# Patient Record
Sex: MALE | Race: OTHER | Employment: OTHER | URBAN - METROPOLITAN AREA
[De-identification: names, ages, dates, MRNs, and addresses within clinical notes are randomized per-mention and may not be internally consistent; named-entity substitution may affect disease eponyms.]

---

## 2017-01-01 ENCOUNTER — TELEPHONE (OUTPATIENT)
Dept: FAMILY MEDICINE CLINIC | Facility: CLINIC | Age: 82
End: 2017-01-01

## 2017-01-01 ENCOUNTER — OFFICE VISIT (OUTPATIENT)
Dept: FAMILY MEDICINE CLINIC | Facility: CLINIC | Age: 82
End: 2017-01-01

## 2017-01-01 ENCOUNTER — APPOINTMENT (OUTPATIENT)
Dept: LAB | Age: 82
End: 2017-01-01
Attending: INTERNAL MEDICINE
Payer: MEDICARE

## 2017-01-01 VITALS
SYSTOLIC BLOOD PRESSURE: 118 MMHG | HEART RATE: 80 BPM | DIASTOLIC BLOOD PRESSURE: 68 MMHG | RESPIRATION RATE: 16 BRPM | BODY MASS INDEX: 30.13 KG/M2 | WEIGHT: 192 LBS | HEIGHT: 67 IN

## 2017-01-01 DIAGNOSIS — R35.1 BENIGN PROSTATIC HYPERPLASIA WITH NOCTURIA: ICD-10-CM

## 2017-01-01 DIAGNOSIS — Z91.81 AT RISK FOR FALLS: ICD-10-CM

## 2017-01-01 DIAGNOSIS — E03.9 HYPOTHYROIDISM, UNSPECIFIED TYPE: Chronic | ICD-10-CM

## 2017-01-01 DIAGNOSIS — F03.91 DEMENTIA WITH BEHAVIORAL DISTURBANCE, UNSPECIFIED DEMENTIA TYPE (HCC): ICD-10-CM

## 2017-01-01 DIAGNOSIS — N40.1 BENIGN PROSTATIC HYPERPLASIA WITH NOCTURIA: ICD-10-CM

## 2017-01-01 DIAGNOSIS — Z99.3 WHEELCHAIR BOUND: Primary | ICD-10-CM

## 2017-01-01 DIAGNOSIS — F03.90 DEMENTIA WITHOUT BEHAVIORAL DISTURBANCE, UNSPECIFIED DEMENTIA TYPE (HCC): ICD-10-CM

## 2017-01-01 DIAGNOSIS — M19.90 ARTHRITIS: ICD-10-CM

## 2017-01-01 DIAGNOSIS — E53.8 B12 DEFICIENCY: ICD-10-CM

## 2017-01-01 DIAGNOSIS — F02.81 MAJOR NEUROCOGNITIVE DISORDER DUE TO MULTIPLE ETIOLOGIES WITH BEHAVIORAL DISTURBANCE (HCC): ICD-10-CM

## 2017-01-01 DIAGNOSIS — M17.12 OSTEOARTHRITIS OF LEFT KNEE, UNSPECIFIED OSTEOARTHRITIS TYPE: Primary | ICD-10-CM

## 2017-01-01 PROCEDURE — 84439 ASSAY OF FREE THYROXINE: CPT

## 2017-01-01 PROCEDURE — 82607 VITAMIN B-12: CPT

## 2017-01-01 PROCEDURE — 36415 COLL VENOUS BLD VENIPUNCTURE: CPT

## 2017-01-01 PROCEDURE — 84443 ASSAY THYROID STIM HORMONE: CPT

## 2017-01-01 PROCEDURE — 99214 OFFICE O/P EST MOD 30 MIN: CPT | Performed by: INTERNAL MEDICINE

## 2017-01-01 RX ORDER — MELATONIN
1000 DAILY
COMMUNITY
End: 2018-01-01 | Stop reason: ALTCHOICE

## 2017-01-01 RX ORDER — FINASTERIDE 5 MG/1
5 TABLET, FILM COATED ORAL DAILY
Qty: 90 TABLET | Refills: 1 | Status: SHIPPED | OUTPATIENT
Start: 2017-01-01 | End: 2018-01-01

## 2017-01-01 RX ORDER — TERAZOSIN 10 MG/1
10 CAPSULE ORAL NIGHTLY
Qty: 90 CAPSULE | Refills: 1 | Status: SHIPPED | OUTPATIENT
Start: 2017-01-01 | End: 2018-01-01

## 2017-01-01 RX ORDER — CITALOPRAM 20 MG/1
20 TABLET ORAL DAILY
Qty: 90 TABLET | Refills: 1 | Status: SHIPPED | OUTPATIENT
Start: 2017-01-01 | End: 2018-01-01

## 2017-01-01 RX ORDER — RISPERIDONE 0.5 MG/1
0.5 TABLET, FILM COATED ORAL 2 TIMES DAILY
Qty: 60 TABLET | Refills: 5 | Status: SHIPPED | OUTPATIENT
Start: 2017-01-01 | End: 2018-01-01

## 2017-01-01 RX ORDER — RISPERIDONE 0.5 MG/1
0.5 TABLET, FILM COATED ORAL 2 TIMES DAILY
Qty: 60 TABLET | Refills: 1 | Status: SHIPPED | OUTPATIENT
Start: 2017-01-01 | End: 2017-01-01

## 2017-01-01 RX ORDER — RISPERIDONE 0.5 MG/1
0.5 TABLET, FILM COATED ORAL 2 TIMES DAILY
Qty: 60 TABLET | Refills: 1
Start: 2017-01-01

## 2017-01-01 RX ORDER — LEVOTHYROXINE SODIUM 0.05 MG/1
50 TABLET ORAL
Qty: 90 TABLET | Refills: 1 | Status: SHIPPED | OUTPATIENT
Start: 2017-01-01 | End: 2018-01-01

## 2017-02-15 ENCOUNTER — APPOINTMENT (OUTPATIENT)
Dept: GENERAL RADIOLOGY | Age: 82
DRG: 193 | End: 2017-02-15
Attending: EMERGENCY MEDICINE
Payer: MEDICARE

## 2017-02-15 ENCOUNTER — HOSPITAL ENCOUNTER (OUTPATIENT)
Facility: HOSPITAL | Age: 82
Setting detail: OBSERVATION
Discharge: HOME HEALTH | DRG: 193 | End: 2017-02-17
Attending: EMERGENCY MEDICINE | Admitting: HOSPITALIST
Payer: MEDICARE

## 2017-02-15 DIAGNOSIS — R09.02 HYPOXIA: ICD-10-CM

## 2017-02-15 DIAGNOSIS — D69.6 THROMBOCYTOPENIA (HCC): ICD-10-CM

## 2017-02-15 DIAGNOSIS — I50.9 HEART FAILURE, UNSPECIFIED HEART FAILURE CHRONICITY, UNSPECIFIED HEART FAILURE TYPE: ICD-10-CM

## 2017-02-15 DIAGNOSIS — R41.0 EPISODIC CONFUSION: ICD-10-CM

## 2017-02-15 DIAGNOSIS — J98.6 ELEVATED HEMIDIAPHRAGM: ICD-10-CM

## 2017-02-15 DIAGNOSIS — J98.01 ACUTE BRONCHOSPASM: ICD-10-CM

## 2017-02-15 DIAGNOSIS — J21.9 ACUTE BRONCHIOLITIS DUE TO UNSPECIFIED ORGANISM: Primary | ICD-10-CM

## 2017-02-15 PROBLEM — Z91.81 AT RISK FOR FALLING: Status: ACTIVE | Noted: 2017-02-15

## 2017-02-15 LAB
ADENOVIRUS PCR:: NEGATIVE
ALBUMIN SERPL-MCNC: 3.2 G/DL (ref 3.5–4.8)
ALP LIVER SERPL-CCNC: 77 U/L (ref 45–117)
ALT SERPL-CCNC: 33 U/L (ref 17–63)
APTT PPP: 30.1 SECONDS (ref 25–34)
AST SERPL-CCNC: 24 U/L (ref 15–41)
ATRIAL RATE: 105 BPM
B PERT DNA SPEC QL NAA+PROBE: NEGATIVE
BASOPHILS # BLD AUTO: 0.02 X10(3) UL (ref 0–0.1)
BASOPHILS NFR BLD AUTO: 0.4 %
BILIRUB SERPL-MCNC: 1 MG/DL (ref 0.1–2)
BILIRUB UR QL STRIP.AUTO: NEGATIVE
BUN BLD-MCNC: 16 MG/DL (ref 8–20)
C PNEUM DNA SPEC QL NAA+PROBE: NEGATIVE
CALCIUM BLD-MCNC: 8.3 MG/DL (ref 8.3–10.3)
CHLORIDE: 106 MMOL/L (ref 101–111)
CLARITY UR REFRACT.AUTO: CLEAR
CO2: 27 MMOL/L (ref 22–32)
COLOR UR AUTO: YELLOW
CORONAVIRUS 229E PCR:: NEGATIVE
CORONAVIRUS HKU1 PCR:: NEGATIVE
CORONAVIRUS NL63 PCR:: NEGATIVE
CORONAVIRUS OC43 PCR:: NEGATIVE
CREAT BLD-MCNC: 1.11 MG/DL (ref 0.7–1.3)
EOSINOPHIL # BLD AUTO: 0.12 X10(3) UL (ref 0–0.3)
EOSINOPHIL NFR BLD AUTO: 2.2 %
ERYTHROCYTE [DISTWIDTH] IN BLOOD BY AUTOMATED COUNT: 13.5 % (ref 11.5–16)
FLUAV H1 2009 PAND RNA SPEC QL NAA+PROBE: NEGATIVE
FLUAV H1 RNA SPEC QL NAA+PROBE: NEGATIVE
FLUAV H3 RNA SPEC QL NAA+PROBE: NEGATIVE
FLUAV RNA SPEC QL NAA+PROBE: NEGATIVE
FLUBV RNA SPEC QL NAA+PROBE: NEGATIVE
FOLATE (FOLIC ACID), SERUM: 15.3 NG/ML (ref 8.7–24)
GLUCOSE BLD-MCNC: 121 MG/DL (ref 70–99)
GLUCOSE UR STRIP.AUTO-MCNC: NEGATIVE MG/DL
HAV AB SERPL IA-ACNC: 237 PG/ML (ref 193–986)
HCT VFR BLD AUTO: 43.5 % (ref 37–53)
HGB BLD-MCNC: 13.5 G/DL (ref 13–17)
IMMATURE GRANULOCYTE COUNT: 0.04 X10(3) UL (ref 0–1)
IMMATURE GRANULOCYTE RATIO %: 0.7 %
INR BLD: 1.1 (ref 0.89–1.12)
LEUKOCYTE ESTERASE UR QL STRIP.AUTO: NEGATIVE
LYMPHOCYTES # BLD AUTO: 0.56 X10(3) UL (ref 0.9–4)
LYMPHOCYTES NFR BLD AUTO: 10.2 %
M PROTEIN MFR SERPL ELPH: 6.9 G/DL (ref 6.1–8.3)
MCH RBC QN AUTO: 25.8 PG (ref 27–33.2)
MCHC RBC AUTO-ENTMCNC: 31 G/DL (ref 31–37)
MCV RBC AUTO: 83.2 FL (ref 80–99)
METAPNEUMOVIRUS PCR:: NEGATIVE
MONOCYTES # BLD AUTO: 0.61 X10(3) UL (ref 0.1–0.6)
MONOCYTES NFR BLD AUTO: 11.1 %
MYCOPLASMA PNEUMONIA PCR:: NEGATIVE
NEUTROPHIL ABS PRELIM: 4.15 X10 (3) UL (ref 1.3–6.7)
NEUTROPHILS # BLD AUTO: 4.15 X10(3) UL (ref 1.3–6.7)
NEUTROPHILS NFR BLD AUTO: 75.4 %
NITRITE UR QL STRIP.AUTO: NEGATIVE
P AXIS: 18 DEGREES
P-R INTERVAL: 130 MS
PARAINFLUENZA 1 PCR:: NEGATIVE
PARAINFLUENZA 2 PCR:: NEGATIVE
PARAINFLUENZA 3 PCR:: NEGATIVE
PARAINFLUENZA 4 PCR:: NEGATIVE
PH UR STRIP.AUTO: 5 [PH] (ref 4.5–8)
PLATELET # BLD AUTO: 147 10(3)UL (ref 150–450)
POTASSIUM SERPL-SCNC: 3.6 MMOL/L (ref 3.6–5.1)
PRO-BETA NATRIURETIC PEPTIDE: 686 PG/ML (ref ?–450)
PROCALCITONIN SERPL-MCNC: <0.11 NG/ML (ref ?–0.11)
PSA SERPL DL<=0.01 NG/ML-MCNC: 14 SECONDS (ref 11.8–14.2)
Q-T INTERVAL: 320 MS
QRS DURATION: 76 MS
QTC CALCULATION (BEZET): 422 MS
R AXIS: 6 DEGREES
RBC # BLD AUTO: 5.23 X10(6)UL (ref 3.8–5.8)
RED CELL DISTRIBUTION WIDTH-SD: 41.1 FL (ref 35.1–46.3)
RHINOVIRUS/ENTERO PCR:: NEGATIVE
RSV RNA SPEC QL NAA+PROBE: POSITIVE
SODIUM SERPL-SCNC: 141 MMOL/L (ref 136–144)
SP GR UR STRIP.AUTO: 1.02 (ref 1–1.03)
T AXIS: -2 DEGREES
TROPONIN: <0.046 NG/ML (ref ?–0.05)
TSI SER-ACNC: 2.04 MIU/ML (ref 0.35–5.5)
UROBILINOGEN UR STRIP.AUTO-MCNC: 1 MG/DL
VENTRICULAR RATE: 105 BPM
WBC # BLD AUTO: 5.5 X10(3) UL (ref 4–13)

## 2017-02-15 PROCEDURE — 99220 INITIAL OBSERVATION CARE,LEVL III: CPT | Performed by: INTERNAL MEDICINE

## 2017-02-15 PROCEDURE — 71020 XR CHEST PA + LAT CHEST (CPT=71020): CPT

## 2017-02-15 RX ORDER — FUROSEMIDE 10 MG/ML
20 INJECTION INTRAMUSCULAR; INTRAVENOUS ONCE
Status: COMPLETED | OUTPATIENT
Start: 2017-02-15 | End: 2017-02-15

## 2017-02-15 RX ORDER — LEVOTHYROXINE SODIUM 0.05 MG/1
50 TABLET ORAL
Status: DISCONTINUED | OUTPATIENT
Start: 2017-02-15 | End: 2017-02-17

## 2017-02-15 RX ORDER — FINASTERIDE 5 MG/1
5 TABLET, FILM COATED ORAL NIGHTLY
Status: DISCONTINUED | OUTPATIENT
Start: 2017-02-15 | End: 2017-02-17

## 2017-02-15 RX ORDER — ONDANSETRON 2 MG/ML
4 INJECTION INTRAMUSCULAR; INTRAVENOUS EVERY 6 HOURS PRN
Status: DISCONTINUED | OUTPATIENT
Start: 2017-02-15 | End: 2017-02-17

## 2017-02-15 RX ORDER — LEVOTHYROXINE SODIUM 0.05 MG/1
50 TABLET ORAL
COMMUNITY
End: 2017-04-11

## 2017-02-15 RX ORDER — HEPARIN SODIUM 5000 [USP'U]/ML
5000 INJECTION, SOLUTION INTRAVENOUS; SUBCUTANEOUS EVERY 8 HOURS
Status: DISCONTINUED | OUTPATIENT
Start: 2017-02-15 | End: 2017-02-17

## 2017-02-15 RX ORDER — FINASTERIDE 5 MG/1
5 TABLET, FILM COATED ORAL NIGHTLY
COMMUNITY
End: 2017-03-28

## 2017-02-15 RX ORDER — POTASSIUM CHLORIDE 20 MEQ/1
40 TABLET, EXTENDED RELEASE ORAL EVERY 4 HOURS
Status: COMPLETED | OUTPATIENT
Start: 2017-02-15 | End: 2017-02-16

## 2017-02-15 RX ORDER — TERAZOSIN 10 MG/1
10 CAPSULE ORAL NIGHTLY
COMMUNITY
End: 2017-06-19

## 2017-02-15 RX ORDER — TERAZOSIN 5 MG/1
10 CAPSULE ORAL NIGHTLY
Status: DISCONTINUED | OUTPATIENT
Start: 2017-02-15 | End: 2017-02-17

## 2017-02-15 RX ORDER — ALBUTEROL SULFATE 2.5 MG/3ML
2.5 SOLUTION RESPIRATORY (INHALATION) ONCE
Status: COMPLETED | OUTPATIENT
Start: 2017-02-15 | End: 2017-02-15

## 2017-02-15 RX ORDER — ACETAMINOPHEN 325 MG/1
650 TABLET ORAL EVERY 6 HOURS PRN
Status: DISCONTINUED | OUTPATIENT
Start: 2017-02-15 | End: 2017-02-17

## 2017-02-15 RX ORDER — IPRATROPIUM BROMIDE AND ALBUTEROL SULFATE 2.5; .5 MG/3ML; MG/3ML
3 SOLUTION RESPIRATORY (INHALATION) EVERY 4 HOURS PRN
Status: DISCONTINUED | OUTPATIENT
Start: 2017-02-15 | End: 2017-02-17

## 2017-02-15 NOTE — H&P
[facility] HOSPITALIST  History and Physical     Andrzej Lank Patient Status:  Emergency    1927 MRN [id number]   Location [address] [facility] Attending Leno Dejesus MD   Hosp Day # 0 PCP [facility]     Chief Comp and Back: No tenderness or deformity. Abdomen: Soft, nontender, nondistended. Positive bowel sounds. No rebound, guarding or organomegaly. Neurologic: No focal neurological deficits. CNII-XII grossly intact. Musculoskeletal: Moves all extremities.   Ext

## 2017-02-15 NOTE — ED NOTES
Nasal swab done for influenza/respiratory panel testing. Universal precautions used while obtaining sample. Sample taken to lab. Kenney well by pt.

## 2017-02-15 NOTE — ED INITIAL ASSESSMENT (HPI)
Pt is living in daughters home, pt started coughing a week ago, pt also is getting up in the middle of the night and having some urinary incontinence.

## 2017-02-15 NOTE — ED PROVIDER NOTES
Patient Seen in: THE [facility] In [place]    History   Patient presents with:  Cough/URI  Urinary Symptoms (urologic)    Stated Complaint: cough and urinary symptoms     HPI    Patient has recently been staying in his daughter's home sin (Room air)       Current:/70 mmHg  Pulse 107  Resp 20  Ht 172.7 cm (5' 8\")  Wt 79.379 kg  BMI 26.61 kg/m2  SpO2 98%        Physical Exam  General: The patient is awake, alert, conversant. Patient answers questions quickly and appropriately.   Eyes: TROPONIN I - Normal   PROTHROMBIN TIME (PT) - Normal   PTT, ACTIVATED - Normal    Narrative: The aPTT Heparin Therapeutic Range is approximately 65- 104 seconds. The therapeutic range has been validated against 0.3-0.7 heparin anti-Xa units/mL.      C changes and no prior studies allowing for comparison. On repeat examination, at rest on 2 L nasal cannula, patient's pulse oximetry was 92%.   I feel that patient would be best off being admitted to the hospital for some diuresis, respiratory treatments,

## 2017-02-16 ENCOUNTER — APPOINTMENT (OUTPATIENT)
Dept: GENERAL RADIOLOGY | Facility: HOSPITAL | Age: 82
DRG: 193 | End: 2017-02-16
Attending: INTERNAL MEDICINE
Payer: MEDICARE

## 2017-02-16 ENCOUNTER — APPOINTMENT (OUTPATIENT)
Dept: CV DIAGNOSTICS | Facility: HOSPITAL | Age: 82
DRG: 193 | End: 2017-02-16
Attending: INTERNAL MEDICINE
Payer: MEDICARE

## 2017-02-16 LAB
BUN BLD-MCNC: 16 MG/DL (ref 8–20)
CALCIUM BLD-MCNC: 8.2 MG/DL (ref 8.3–10.3)
CHLORIDE: 106 MMOL/L (ref 101–111)
CO2: 27 MMOL/L (ref 22–32)
CREAT BLD-MCNC: 0.95 MG/DL (ref 0.7–1.3)
ERYTHROCYTE [DISTWIDTH] IN BLOOD BY AUTOMATED COUNT: 13.6 % (ref 11.5–16)
GLUCOSE BLD-MCNC: 101 MG/DL (ref 70–99)
HCT VFR BLD AUTO: 38.4 % (ref 37–53)
HGB BLD-MCNC: 12.5 G/DL (ref 13–17)
MCH RBC QN AUTO: 26.6 PG (ref 27–33.2)
MCHC RBC AUTO-ENTMCNC: 32.6 G/DL (ref 31–37)
MCV RBC AUTO: 81.7 FL (ref 80–99)
PLATELET # BLD AUTO: 134 10(3)UL (ref 150–450)
POTASSIUM SERPL-SCNC: 4.2 MMOL/L (ref 3.6–5.1)
RBC # BLD AUTO: 4.7 X10(6)UL (ref 3.8–5.8)
RED CELL DISTRIBUTION WIDTH-SD: 40.4 FL (ref 35.1–46.3)
SODIUM SERPL-SCNC: 142 MMOL/L (ref 136–144)
WBC # BLD AUTO: 6.1 X10(3) UL (ref 4–13)

## 2017-02-16 PROCEDURE — 93306 TTE W/DOPPLER COMPLETE: CPT

## 2017-02-16 PROCEDURE — 93306 TTE W/DOPPLER COMPLETE: CPT | Performed by: INTERNAL MEDICINE

## 2017-02-16 PROCEDURE — 99225 SUBSEQUENT OBSERVATION CARE: CPT | Performed by: HOSPITALIST

## 2017-02-16 PROCEDURE — 71020 XR CHEST PA + LAT CHEST (CPT=71020): CPT

## 2017-02-16 RX ORDER — BENZONATATE 200 MG/1
200 CAPSULE ORAL 3 TIMES DAILY PRN
Status: DISCONTINUED | OUTPATIENT
Start: 2017-02-16 | End: 2017-02-17

## 2017-02-16 RX ORDER — GUAIFENESIN 600 MG
600 TABLET, EXTENDED RELEASE 12 HR ORAL 2 TIMES DAILY
Status: DISCONTINUED | OUTPATIENT
Start: 2017-02-16 | End: 2017-02-17

## 2017-02-16 RX ORDER — METHYLPREDNISOLONE SODIUM SUCCINATE 40 MG/ML
40 INJECTION, POWDER, LYOPHILIZED, FOR SOLUTION INTRAMUSCULAR; INTRAVENOUS EVERY 12 HOURS
Status: DISCONTINUED | OUTPATIENT
Start: 2017-02-16 | End: 2017-02-17

## 2017-02-16 NOTE — PLAN OF CARE
CARDIOVASCULAR - ADULT    • Absence of cardiac arrhythmias or at baseline Progressing        Patient/Family Goals    • Patient/Family Long Term Goal Progressing    • Patient/Family Short Term Goal Progressing        Pt AOx1-2, Mary speaking, SR-ST, lung

## 2017-02-16 NOTE — PLAN OF CARE
CARDIOVASCULAR - ADULT    • Absence of cardiac arrhythmias or at baseline Progressing        Patient/Family Goals    • Patient/Family Long Term Goal Progressing    • Patient/Family Short Term Goal Progressing

## 2017-02-16 NOTE — PHYSICAL THERAPY NOTE
PHYSICAL THERAPY EVALUATION - INPATIENT     Room Number: 2701/0009-W  Evaluation Date: 2/16/2017  Type of Evaluation: Initial  Physician Order: PT Eval and Treat    Presenting Problem: bronchitis  Reason for Therapy: Mobility Dysfunction and Discharge are within functional limits     Lower extremity ROM is within functional limits     Lower extremity strength is grossly 3+/5    BALANCE  Static Sitting: Fair +  Dynamic Sitting: Fair  Static Standing: Poor +  Dynamic Standing: Poor +    ADDITIONAL TESTS Exercise/Education Provided:  Bed mobility  Energy conservation  Functional activity tolerated  Transfer training    Patient End of Session: Up in chair;Needs met;Call light within reach;RN aware of session/findings; All patient questions and concerns a

## 2017-02-16 NOTE — PROGRESS NOTES
Pt received from  ER. A/ox4. [facility]. Glasses at bedside. 2L at rest. Lungs with coarse rhonchi and expiratory wheezes posteriorly throughout. Cough is wet, but non-productive. 3+ edema to BLE and 1+ pedal pulses. BM yesterday.  PT to see penny

## 2017-02-16 NOTE — PLAN OF CARE
NURSING ADMISSION NOTE      Patient admitted via cart  Oriented to room. Safety precautions initiated. Bed in low position. Call light in reach.     Admission navigator done report given to noe eason

## 2017-02-16 NOTE — PAYOR COMM NOTE
Attending Physician: Marielle Taylor DO    Review Type: ADMISSION   Reviewer: Nisha Quiroga       Date: February 16, 2017 - 7:53 AM  Payor: [employer] MEDICARE ADV HMO  Authorization Number: [id number]/A  Admit date: 2/15/2017  2:11 PM   Admitted from Emergency Dept.: y finasteride (PROSCAR) tab 5 mg     Date Action Dose Route User    2/15/2017 2008 Given 5 mg Oral Bindu TALLEY, RN      furosemide (LASIX) injection 20 mg     Date Action Dose Route User    2/15/2017 1549 Given 20 mg Intravenous Mireyaparul Howard, RN the following:     Glucose 101 (*)     Calcium, Total 8.2 (*)     All other components within normal limits   CBC, PLATELET; NO DIFFERENTIAL - Abnormal; Notable for the following:     HGB 12.5 (*)     .0 (*)     MCH 26.6 (*)     All other components bronchitis  2. Nebs and O2 as needed  3. Check ECHO  5. Essential HTN  6. BPH  7.  Hypothyroid  1. synthroid

## 2017-02-16 NOTE — CM/SW NOTE
02/16/17 0900   CM/SW Referral Data   Referral Source Social Work (self-referral)   Reason for Referral Discharge planning   Informant Patient   Pertinent Medical Hx   Primary Care Physician Name Dr. Alon Nino   Patient Info   Patient's Mental

## 2017-02-17 ENCOUNTER — HOSPITAL ENCOUNTER (INPATIENT)
Facility: HOSPITAL | Age: 82
LOS: 3 days | Discharge: HOME HEALTH | DRG: 193 | End: 2017-02-21
Attending: STUDENT IN AN ORGANIZED HEALTH CARE EDUCATION/TRAINING PROGRAM | Admitting: HOSPITALIST
Payer: MEDICARE

## 2017-02-17 ENCOUNTER — APPOINTMENT (OUTPATIENT)
Dept: GENERAL RADIOLOGY | Facility: HOSPITAL | Age: 82
DRG: 193 | End: 2017-02-17
Attending: STUDENT IN AN ORGANIZED HEALTH CARE EDUCATION/TRAINING PROGRAM
Payer: MEDICARE

## 2017-02-17 VITALS
BODY MASS INDEX: 25.76 KG/M2 | SYSTOLIC BLOOD PRESSURE: 144 MMHG | HEIGHT: 68 IN | DIASTOLIC BLOOD PRESSURE: 78 MMHG | TEMPERATURE: 98 F | RESPIRATION RATE: 18 BRPM | WEIGHT: 170 LBS | OXYGEN SATURATION: 93 % | HEART RATE: 114 BPM

## 2017-02-17 DIAGNOSIS — R00.0 TACHYCARDIA: ICD-10-CM

## 2017-02-17 DIAGNOSIS — R06.00 DYSPNEA, UNSPECIFIED TYPE: Primary | ICD-10-CM

## 2017-02-17 LAB
BASOPHILS # BLD AUTO: 0.03 X10(3) UL (ref 0–0.1)
BASOPHILS NFR BLD AUTO: 0.2 %
EOSINOPHIL # BLD AUTO: 0 X10(3) UL (ref 0–0.3)
EOSINOPHIL NFR BLD AUTO: 0 %
ERYTHROCYTE [DISTWIDTH] IN BLOOD BY AUTOMATED COUNT: 13.6 % (ref 11.5–16)
HCT VFR BLD AUTO: 42.5 % (ref 37–53)
HGB BLD-MCNC: 14.7 G/DL (ref 13–17)
IMMATURE GRANULOCYTE COUNT: 0.07 X10(3) UL (ref 0–1)
IMMATURE GRANULOCYTE RATIO %: 0.5 %
LYMPHOCYTES # BLD AUTO: 0.56 X10(3) UL (ref 0.9–4)
LYMPHOCYTES NFR BLD AUTO: 4.3 %
MCH RBC QN AUTO: 27.6 PG (ref 27–33.2)
MCHC RBC AUTO-ENTMCNC: 34.6 G/DL (ref 31–37)
MCV RBC AUTO: 79.7 FL (ref 80–99)
MONOCYTES # BLD AUTO: 0.66 X10(3) UL (ref 0.1–0.6)
MONOCYTES NFR BLD AUTO: 5.1 %
NEUTROPHIL ABS PRELIM: 11.7 X10 (3) UL (ref 1.3–6.7)
NEUTROPHILS # BLD AUTO: 11.7 X10(3) UL (ref 1.3–6.7)
NEUTROPHILS NFR BLD AUTO: 89.9 %
PLATELET # BLD AUTO: 182 10(3)UL (ref 150–450)
RBC # BLD AUTO: 5.33 X10(6)UL (ref 3.8–5.8)
RED CELL DISTRIBUTION WIDTH-SD: 38.8 FL (ref 35.1–46.3)
WBC # BLD AUTO: 13 X10(3) UL (ref 4–13)

## 2017-02-17 PROCEDURE — 71010 XR CHEST AP PORTABLE  (CPT=71010): CPT

## 2017-02-17 PROCEDURE — 99217 OBSERVATION CARE DISCHARGE: CPT | Performed by: HOSPITALIST

## 2017-02-17 RX ORDER — LORAZEPAM 2 MG/ML
0.5 INJECTION INTRAMUSCULAR ONCE
Status: COMPLETED | OUTPATIENT
Start: 2017-02-17 | End: 2017-02-17

## 2017-02-17 RX ORDER — IPRATROPIUM BROMIDE AND ALBUTEROL SULFATE 2.5; .5 MG/3ML; MG/3ML
3 SOLUTION RESPIRATORY (INHALATION) ONCE
Status: COMPLETED | OUTPATIENT
Start: 2017-02-17 | End: 2017-02-18

## 2017-02-17 RX ORDER — METHYLPREDNISOLONE 4 MG/1
TABLET ORAL
Qty: 1 PACKAGE | Refills: 0 | Status: SHIPPED | OUTPATIENT
Start: 2017-02-17 | End: 2017-03-28

## 2017-02-17 RX ORDER — ACETAMINOPHEN 325 MG/1
650 TABLET ORAL ONCE
Status: COMPLETED | OUTPATIENT
Start: 2017-02-17 | End: 2017-02-17

## 2017-02-17 NOTE — CM/SW NOTE
RAMAKRISHNA informed by RN that she received phone call from patient's daughter José Miguel Reid asking for SW to arranged [place] [address] with the same agency that patient's spouse is to be set up with at home.  RAMAKRISHNA contacted phone number that daughter provided RN ([phone]) SW spoke t

## 2017-02-17 NOTE — PROGRESS NOTES
[facility] HOSPITALIST  Progress Note     Donita Blizzard Patient Status:  Observation    1927 MRN [id number]   [facility] 2NE-A Attending Arthur Diallo, [address] [facility] Day # 1 PCP Martin Dickerson     Chief Complaint: SOB    S: Patient se RSV  1. Supportive care  2. Bronchospasm secondary to #1  1. Steroids/nebs  3. Mild fluid overload  1. Improved with lasix given in ED  2. F/u echo report  4. Hypoxia   1. Secondary to #1  5. Essential HTN  1. Controlled  6. BPH  1. Continue home meds  7.

## 2017-02-17 NOTE — DISCHARGE SUMMARY
[facility]OSPITAL[facility]  DISCHARGE SUMMARY     Tyler Scott Patient Status:  Observation    1927 MRN [id number]   [facility] 2NE-A Attending Archana Dougherty, [address] [facility] Day # 2 PCP Nicki Nava     Date of Admission: 2/15/2017  Raphael admitted due to cough and SOB. Viral panel positive for RSV. Patient treated with bronchodilators, steroids, and decongestants. Patient clinically improved with above treatment and discharged home in good condition.      Procedures during hospitalization:

## 2017-02-17 NOTE — PHYSICAL THERAPY NOTE
PHYSICAL THERAPY TREATMENT NOTE - INPATIENT    Room Number: 9792/1549-M     Session: 1   Number of Visits to Meet Established Goals: 5    Presenting Problem: bronchitis    History related to current admission: pt admitted due to worsening cough, diagnosed the patient currently need. ..   -   Moving to and from a bed to a chair (including a wheelchair)?: None   -   Need to walk in hospital room?: None   -   Climbing 3-5 steps with a railing?: A Little (per clinical judgement)       AM-PAC Score:  Raw Score: 2 Von Ambriz  Frequency (Obs): 5x/week    CURRENT GOALS       Goal #1  Patient is able to demonstrate supine - sit EOB @ level: supervision       Goal #2  Patient is able to demonstrate transfers Sit to/from Stand at assistance level: supervision       Goal #3

## 2017-02-17 NOTE — CM/SW NOTE
SW received page through [address] that [facility] will be able to provide home care for patient at discharge. RN updated.     [facility]  [address] [facility], [address] [facility]

## 2017-02-17 NOTE — PLAN OF CARE
CARDIOVASCULAR - ADULT    • Absence of cardiac arrhythmias or at baseline Progressing        Impaired Functional Mobility    • Achieve highest/safest level of mobility/gait Progressing        Patient/Family Goals    • Patient/Family Long Term Goal Progress

## 2017-02-17 NOTE — PROGRESS NOTES
[facility] HOSPITALIST  Progress Note     Stephanie Torres Patient Status:  Observation    1927 MRN [id number]   [facility] 2NE-A Attending Randy Alexander, [address] [facility] Day # 2 PCP Ginger 68     Chief Complaint: SOB    S: Patient se bronchitis secondary to RSV  1. Supportive care  2. Bronchospasm secondary to #1  1. Steroids/nebs  3. Mild fluid overload  1. Improved with lasix given in ED  2. Echo negative   4. Hypoxia   1. Secondary to #1  2. Resolved   5. Essential HTN  1.  Controlle

## 2017-02-17 NOTE — CM/SW NOTE
SW received return phone call from patient's daughter Jorge Alberto Stacy regarding home healthcare at discharge.  Patient's daughter states that patient's spouse will be having [address] at home and would like the same home healthcare for patient at [place] [address]

## 2017-02-18 PROBLEM — R00.0 TACHYCARDIA: Status: ACTIVE | Noted: 2017-02-18

## 2017-02-18 PROBLEM — R06.00 DYSPNEA, UNSPECIFIED TYPE: Status: ACTIVE | Noted: 2017-02-18

## 2017-02-18 PROBLEM — E03.9 HYPOTHYROIDISM: Chronic | Status: ACTIVE | Noted: 2017-02-18

## 2017-02-18 PROBLEM — E87.5 HYPERKALEMIA: Status: ACTIVE | Noted: 2017-02-18

## 2017-02-18 PROBLEM — E87.3 METABOLIC ALKALOSIS: Status: ACTIVE | Noted: 2017-02-18

## 2017-02-18 PROBLEM — F03.90 DEMENTIA (HCC): Status: ACTIVE | Noted: 2017-02-18

## 2017-02-18 PROBLEM — E87.1 HYPONATREMIA: Status: ACTIVE | Noted: 2017-02-18

## 2017-02-18 PROBLEM — R06.00 DYSPNEA: Status: ACTIVE | Noted: 2017-02-18

## 2017-02-18 PROBLEM — J15.6 GRAM-NEGATIVE PNEUMONIA (HCC): Status: ACTIVE | Noted: 2017-02-18

## 2017-02-18 PROBLEM — R79.89 AZOTEMIA: Status: ACTIVE | Noted: 2017-02-18

## 2017-02-18 PROBLEM — E87.2 METABOLIC ACIDOSIS: Status: ACTIVE | Noted: 2017-02-18

## 2017-02-18 LAB
ALBUMIN SERPL-MCNC: 3.2 G/DL (ref 3.5–4.8)
ALLENS TEST: POSITIVE
ALP LIVER SERPL-CCNC: 81 U/L (ref 45–117)
ALT SERPL-CCNC: 43 U/L (ref 17–63)
ARTERIAL BLD GAS O2 SATURATION: 95 % (ref 92–100)
ARTERIAL BLOOD GAS BASE EXCESS: -0.1
ARTERIAL BLOOD GAS HCO3: 23.7 MEQ/L (ref 22–26)
ARTERIAL BLOOD GAS PCO2: 37 MM HG (ref 35–45)
ARTERIAL BLOOD GAS PH: 7.43 (ref 7.35–7.45)
ARTERIAL BLOOD GAS PO2: 87 MM HG (ref 80–105)
AST SERPL-CCNC: 53 U/L (ref 15–41)
BILIRUB SERPL-MCNC: 1.1 MG/DL (ref 0.1–2)
BUN BLD-MCNC: 24 MG/DL (ref 8–20)
BUN BLD-MCNC: 29 MG/DL (ref 8–20)
CALCIUM BLD-MCNC: 8.5 MG/DL (ref 8.3–10.3)
CALCIUM BLD-MCNC: 9.4 MG/DL (ref 8.3–10.3)
CALCULATED O2 SATURATION: 97 % (ref 92–100)
CARBOXYHEMOGLOBIN: 1.1 % SAT (ref 0–3)
CHLORIDE: 102 MMOL/L (ref 101–111)
CHLORIDE: 104 MMOL/L (ref 101–111)
CO2: 21 MMOL/L (ref 22–32)
CO2: 27 MMOL/L (ref 22–32)
CREAT BLD-MCNC: 1.12 MG/DL (ref 0.7–1.3)
CREAT BLD-MCNC: 1.18 MG/DL (ref 0.7–1.3)
ERYTHROCYTE [DISTWIDTH] IN BLOOD BY AUTOMATED COUNT: 13.4 % (ref 11.5–16)
GLUCOSE BLD-MCNC: 129 MG/DL (ref 70–99)
GLUCOSE BLD-MCNC: 166 MG/DL (ref 70–99)
HCT VFR BLD AUTO: 38.1 % (ref 37–53)
HGB BLD-MCNC: 12.7 G/DL (ref 13–17)
IONIZED CALCIUM: 1.22 MMOL/L (ref 1.12–1.32)
L/M: 6 L/MIN
LACTIC ACID ARTERIAL: 1.3 MMOL/L (ref 0.5–2)
LACTIC ACID: 1.3 MMOL/L (ref 0.5–2)
LACTIC ACID: 1.6 MMOL/L (ref 0.5–2)
LACTIC ACID: 2.5 MMOL/L (ref 0.5–2)
M PROTEIN MFR SERPL ELPH: 7.8 G/DL (ref 6.1–8.3)
MCH RBC QN AUTO: 26.7 PG (ref 27–33.2)
MCHC RBC AUTO-ENTMCNC: 33.3 G/DL (ref 31–37)
MCV RBC AUTO: 80.2 FL (ref 80–99)
METHEMOGLOBIN: 0.7 % SAT (ref 0.4–1.5)
PATIENT TEMPERATURE: 99.3 F
PLATELET # BLD AUTO: 164 10(3)UL (ref 150–450)
POTASSIUM BLOOD GAS: 3.7 MMOL/L (ref 3.6–5.1)
POTASSIUM SERPL-SCNC: 4 MMOL/L (ref 3.6–5.1)
POTASSIUM SERPL-SCNC: 5.3 MMOL/L (ref 3.6–5.1)
PRO-BETA NATRIURETIC PEPTIDE: 580 PG/ML (ref ?–450)
PROCALCITONIN SERPL-MCNC: 0.16 NG/ML (ref ?–0.11)
RBC # BLD AUTO: 4.75 X10(6)UL (ref 3.8–5.8)
RED CELL DISTRIBUTION WIDTH-SD: 39 FL (ref 35.1–46.3)
SODIUM BLOOD GAS: 138 MMOL/L (ref 136–144)
SODIUM SERPL-SCNC: 133 MMOL/L (ref 136–144)
SODIUM SERPL-SCNC: 141 MMOL/L (ref 136–144)
TOTAL HEMOGLOBIN: 13.5 G/DL (ref 12.6–17.4)
TROPONIN: <0.046 NG/ML (ref ?–0.05)
WBC # BLD AUTO: 11.8 X10(3) UL (ref 4–13)

## 2017-02-18 PROCEDURE — 99223 1ST HOSP IP/OBS HIGH 75: CPT | Performed by: HOSPITALIST

## 2017-02-18 RX ORDER — LEVOTHYROXINE SODIUM 0.05 MG/1
50 TABLET ORAL
Status: DISCONTINUED | OUTPATIENT
Start: 2017-02-18 | End: 2017-02-21

## 2017-02-18 RX ORDER — IPRATROPIUM BROMIDE AND ALBUTEROL SULFATE 2.5; .5 MG/3ML; MG/3ML
3 SOLUTION RESPIRATORY (INHALATION) EVERY 4 HOURS PRN
Status: DISCONTINUED | OUTPATIENT
Start: 2017-02-18 | End: 2017-02-21

## 2017-02-18 RX ORDER — SODIUM CHLORIDE 9 MG/ML
INJECTION, SOLUTION INTRAVENOUS CONTINUOUS
Status: ACTIVE | OUTPATIENT
Start: 2017-02-18 | End: 2017-02-18

## 2017-02-18 RX ORDER — POLYETHYLENE GLYCOL 3350 17 G/17G
17 POWDER, FOR SOLUTION ORAL DAILY PRN
Status: DISCONTINUED | OUTPATIENT
Start: 2017-02-18 | End: 2017-02-21

## 2017-02-18 RX ORDER — FINASTERIDE 5 MG/1
5 TABLET, FILM COATED ORAL NIGHTLY
Status: DISCONTINUED | OUTPATIENT
Start: 2017-02-18 | End: 2017-02-21

## 2017-02-18 RX ORDER — ONDANSETRON 2 MG/ML
4 INJECTION INTRAMUSCULAR; INTRAVENOUS EVERY 6 HOURS PRN
Status: DISCONTINUED | OUTPATIENT
Start: 2017-02-18 | End: 2017-02-21

## 2017-02-18 RX ORDER — KETOROLAC TROMETHAMINE 15 MG/ML
15 INJECTION, SOLUTION INTRAMUSCULAR; INTRAVENOUS EVERY 6 HOURS PRN
Status: DISPENSED | OUTPATIENT
Start: 2017-02-18 | End: 2017-02-20

## 2017-02-18 RX ORDER — GUAIFENESIN 600 MG
600 TABLET, EXTENDED RELEASE 12 HR ORAL 2 TIMES DAILY
Status: DISCONTINUED | OUTPATIENT
Start: 2017-02-18 | End: 2017-02-21

## 2017-02-18 RX ORDER — DOCUSATE SODIUM 100 MG/1
100 CAPSULE, LIQUID FILLED ORAL 2 TIMES DAILY
Status: DISCONTINUED | OUTPATIENT
Start: 2017-02-18 | End: 2017-02-21

## 2017-02-18 RX ORDER — METHYLPREDNISOLONE SODIUM SUCCINATE 40 MG/ML
40 INJECTION, POWDER, LYOPHILIZED, FOR SOLUTION INTRAMUSCULAR; INTRAVENOUS EVERY 12 HOURS
Status: DISCONTINUED | OUTPATIENT
Start: 2017-02-18 | End: 2017-02-20

## 2017-02-18 RX ORDER — BISACODYL 10 MG
10 SUPPOSITORY, RECTAL RECTAL
Status: DISCONTINUED | OUTPATIENT
Start: 2017-02-18 | End: 2017-02-21

## 2017-02-18 RX ORDER — ACETAMINOPHEN 325 MG/1
650 TABLET ORAL EVERY 6 HOURS PRN
Status: DISCONTINUED | OUTPATIENT
Start: 2017-02-18 | End: 2017-02-21

## 2017-02-18 RX ORDER — SODIUM PHOSPHATE, DIBASIC AND SODIUM PHOSPHATE, MONOBASIC 7; 19 G/133ML; G/133ML
1 ENEMA RECTAL ONCE AS NEEDED
Status: ACTIVE | OUTPATIENT
Start: 2017-02-18 | End: 2017-02-18

## 2017-02-18 RX ORDER — ENOXAPARIN SODIUM 100 MG/ML
40 INJECTION SUBCUTANEOUS DAILY
Status: DISCONTINUED | OUTPATIENT
Start: 2017-02-18 | End: 2017-02-21

## 2017-02-18 RX ORDER — SODIUM CHLORIDE 9 MG/ML
INJECTION, SOLUTION INTRAVENOUS CONTINUOUS
Status: DISCONTINUED | OUTPATIENT
Start: 2017-02-18 | End: 2017-02-21

## 2017-02-18 RX ORDER — TERAZOSIN 5 MG/1
10 CAPSULE ORAL NIGHTLY
Status: DISCONTINUED | OUTPATIENT
Start: 2017-02-18 | End: 2017-02-21

## 2017-02-18 RX ORDER — BENZONATATE 200 MG/1
200 CAPSULE ORAL 3 TIMES DAILY PRN
Status: DISCONTINUED | OUTPATIENT
Start: 2017-02-18 | End: 2017-02-21

## 2017-02-18 NOTE — PLAN OF CARE
Patient/Family Goals    • Patient/Family Short Term Goal Progressing        RESPIRATORY - ADULT    • Achieves optimal ventilation and oxygenation Progressing          NEUROLOGICAL - ADULT    • Achieves stable or improved neurological status Not Progressing

## 2017-02-18 NOTE — ED INITIAL ASSESSMENT (HPI)
EMS was originally called because pt had NICK and was not acting normal per family. According to family he occasionally is confused with is out of norm for him. His altered mental status has been happening for the last month.  Pt was just d/c'd from hospital

## 2017-02-18 NOTE — H&P
[facility] HOSPITALIST  History and Physical     Fly Media Patient Status:  Inpatient    1927 MRN [id number]   [facility] 5NW-A Attending Mehul Greenwood MD   Hosp Day # 1 PCP Ginger 68     Chief Complaint: Dyspnea    H to encounter. Current Outpatient Prescriptions on File Prior to Encounter:  methylPREDNISolone 4 MG Oral Tablet Therapy Pack Use as directed Disp: 1 Package Rfl: 0   Terazosin HCl 10 MG Oral Cap Take 10 mg by mouth nightly.  Disp:  Rfl:    finasteride 5 MG 133*   K  3.6  4.2  5.3*   CL  106  106  102   CO2  27.0  27.0  21.0*   ALKPHO  77   --   81   AST  24   --   53*   ALT  33   --   43   BILT  1.0   --   1.1   TP  6.9   --   7.8       Recent Labs   Lab  02/15/17   1437   PTP  14.0   INR  1.10       Recent

## 2017-02-18 NOTE — ED PROVIDER NOTES
Patient Seen in: [facility] Emergency Department    History   Patient presents with:  Dyspnea NICK SOB (respiratory)    Stated Complaint:     HPI    Patient is an 77-year-old gentleman presenting to the emergency department with difficulty breathing All other systems reviewed and negative except as noted above. PSFH elements reviewed from today and agreed except as otherwise stated in HPI.     Physical Exam       ED Triage Vitals   BP 02/17/17 2326 129/72 mmHg   Pulse 02/17/17 2326 143   Resp 02 NATRIURETIC PEPTIDE - Abnormal; Notable for the following:     Pro-Beta Natriuretic Peptide 580 (*)     All other components within normal limits   PROCALCITONIN - Abnormal; Notable for the following:     Procalcitonin 0.16 (*)     All other components wit tachycardia. Also received nebulizer treatment. Brief episode of hypoxia with a pulse ox of 89% was noted after a DuoNeb. Patient maintaining good oxygen saturation while in supplemental oxygen via nasal cannula.     Lactic acid of 2.5 and pro calcitonin

## 2017-02-18 NOTE — PROGRESS NOTES
[facility] HOSPITALIST  Progress Note     Alma Delia Perales Patient Status:  Observation    1927 MRN [id number]   [facility] 2NE-A Attending Obdulia Blackmon, [address] [facility] Day # 1 PCP Varun Shipley     Chief Complaint: SOB    S: Patient se finasteride  5 mg Oral Nightly   • Levothyroxine Sodium  50 mcg Oral Before breakfast   • Terazosin HCl  10 mg Oral Nightly   • enoxaparin  40 mg Subcutaneous Daily   • docusate sodium  100 mg Oral BID   • azithromycin  500 mg Intravenous Q24H   • raúl

## 2017-02-18 NOTE — PLAN OF CARE
NEUROLOGICAL - ADULT    • Achieves stable or improved neurological status Progressing        Patient/Family Goals    • Patient/Family Long Term Goal Progressing    • Patient/Family Short Term Goal Progressing        RESPIRATORY - ADULT    • Achieves optima

## 2017-02-18 NOTE — PROGRESS NOTES
D/c patient to home with family in stable condition  Tele and IV removed without complication  No c/o pain  All d/c instructions including meds and follow up care were reviewed with family  All questions answered  Prescription for steroid sent with family

## 2017-02-19 ENCOUNTER — APPOINTMENT (OUTPATIENT)
Dept: GENERAL RADIOLOGY | Facility: HOSPITAL | Age: 82
DRG: 193 | End: 2017-02-19
Attending: HOSPITALIST
Payer: MEDICARE

## 2017-02-19 PROCEDURE — 99232 SBSQ HOSP IP/OBS MODERATE 35: CPT | Performed by: HOSPITALIST

## 2017-02-19 PROCEDURE — 71020 XR CHEST PA + LAT CHEST (CPT=71020): CPT

## 2017-02-19 RX ORDER — HALOPERIDOL 5 MG/ML
1 INJECTION INTRAMUSCULAR EVERY 4 HOURS PRN
Status: DISCONTINUED | OUTPATIENT
Start: 2017-02-19 | End: 2017-02-20

## 2017-02-19 RX ORDER — LORAZEPAM 2 MG/ML
0.5 INJECTION INTRAMUSCULAR EVERY 6 HOURS PRN
Status: DISCONTINUED | OUTPATIENT
Start: 2017-02-19 | End: 2017-02-20

## 2017-02-19 RX ORDER — HALOPERIDOL 5 MG/ML
5 INJECTION INTRAMUSCULAR ONCE
Status: COMPLETED | OUTPATIENT
Start: 2017-02-19 | End: 2017-02-19

## 2017-02-19 NOTE — PHYSICAL THERAPY NOTE
PHYSICAL THERAPY EVALUATION - INPATIENT     Room Number: 503/503-A  Evaluation Date: 2/19/2017  Type of Evaluation: Initial  Physician Order: PT Eval and Treat    Presenting Problem: SOB, pneumonia   Reason for Therapy: Mobility Dysfunction and Dischar primary language )  Fall Risk: High fall risk    WEIGHT BEARING RESTRICTION  Weight Bearing Restriction: None                PAIN ASSESSMENT  Ratin  Location: denies   Management Techniques:  Activity promotion;Breathing techniques    COGNITION  · Overa 150ft  Assistive Device: Cane  Pattern: Shuffle (wide ANKITA, one LOB able to correct, c/o L knee pain )  Stoop/Curb Assistance: Not tested  Comment : above scores based on FIM    Skilled Therapy Provided: Patient up in chair agrees to PT evaluation.  Patient training;Strengthening;Transfer training;Balance training;Stair training;Stoop training  Rehab Potential : Good  Frequency (Obs): 5x/week  Number of Visits to Meet Established Goals: 5      CURRENT GOALS    Goal #1 Patient is able to demonstrate supine - s

## 2017-02-19 NOTE — PLAN OF CARE
NEUROLOGICAL - ADULT    • Achieves stable or improved neurological status Progressing        Patient/Family Goals    • Patient/Family Short Term Goal Progressing        RESPIRATORY - ADULT    • Achieves optimal ventilation and oxygenation Progressing

## 2017-02-19 NOTE — PLAN OF CARE
Impaired Functional Mobility    • Achieve highest/safest level of mobility/gait Progressing        NEUROLOGICAL - ADULT    • Achieves stable or improved neurological status Progressing        Patient/Family Goals    • Patient/Family Long Term Goal Progress

## 2017-02-19 NOTE — PROGRESS NOTES
[facility] HOSPITALIST  Progress Note     Alma Delia Diaz Patient Status:  Observation    1927 MRN [id number]   [facility] 2NE-A Attending Obdulia Blackmon, [address] [facility] Day # 2 PCP Ginger 68     Chief Complaint: SOB    S: Patient se MethylPREDNISolone Sodium Succ  40 mg Intravenous Q12H   • GuaiFENesin ER  600 mg Oral BID       ASSESSMENT / PLAN:     1. Pneumonia   1. Viral suspected given + RSV  2. Empiric abx started given elevated PCT  3. Cultures negative so far   2.  Bronchospasm

## 2017-02-20 LAB
ATRIAL RATE: 141 BPM
P AXIS: 29 DEGREES
P-R INTERVAL: 138 MS
Q-T INTERVAL: 266 MS
QRS DURATION: 66 MS
QTC CALCULATION (BEZET): 407 MS
R AXIS: 7 DEGREES
T AXIS: 20 DEGREES
VENTRICULAR RATE: 141 BPM

## 2017-02-20 PROCEDURE — 90792 PSYCH DIAG EVAL W/MED SRVCS: CPT | Performed by: OTHER

## 2017-02-20 PROCEDURE — 99232 SBSQ HOSP IP/OBS MODERATE 35: CPT | Performed by: HOSPITALIST

## 2017-02-20 RX ORDER — RISPERIDONE 0.25 MG/1
0.25 TABLET, FILM COATED ORAL 2 TIMES DAILY PRN
Status: DISCONTINUED | OUTPATIENT
Start: 2017-02-20 | End: 2017-02-21

## 2017-02-20 RX ORDER — PREDNISONE 20 MG/1
40 TABLET ORAL
Status: DISCONTINUED | OUTPATIENT
Start: 2017-02-21 | End: 2017-02-21

## 2017-02-20 RX ORDER — RISPERIDONE 0.25 MG/1
0.25 TABLET, FILM COATED ORAL NIGHTLY
Status: DISCONTINUED | OUTPATIENT
Start: 2017-02-20 | End: 2017-02-21

## 2017-02-20 RX ORDER — HALOPERIDOL 5 MG/ML
2 INJECTION INTRAMUSCULAR EVERY 4 HOURS PRN
Status: DISCONTINUED | OUTPATIENT
Start: 2017-02-20 | End: 2017-02-21

## 2017-02-20 NOTE — PHYSICAL THERAPY NOTE
PHYSICAL THERAPY TREATMENT NOTE - INPATIENT    Room Number: 781/983-I     Session: 1/5   Number of Visits to Meet Established Goals: 5    Presenting Problem: SOB, pneumonia     Problem List  Principal Problem:    Dyspnea, unspecified type  Active Problems to walk in hospital room?: A Little   -   Climbing 3-5 steps with a railing?: A Little       AM-PAC Score:  Raw Score: 19   PT Approx Degree of Impairment Score: 41.77%   Standardized Score (AM-PAC Scale): 45.44   CMS Modifier (G-Code): CK    FUNCTIONAL AB mobility; Patient education; Family education;Gait training;Strengthening;Transfer training;Balance training;Stair training;Stoop training  Rehab Potential : Good  Frequency (Obs): 5x/week    CURRENT GOALS      Goal #1  Patient is able to demonstrate supine

## 2017-02-20 NOTE — PROGRESS NOTES
[facility] HOSPITALIST  Progress Note     Parviz Murrieta Patient Status:  Observation    1927 MRN [id number]   [facility] 2NE-A Attending Ced Lincoln, [address] [facility] Day # 3 PCP Amarilis Deras     Chief Complaint: SOB    S: Patient se piperacillin-tazobactam  3.375 g Intravenous Q8H   • GuaiFENesin ER  600 mg Oral BID       ASSESSMENT / PLAN:     1. Pneumonia   1. Viral suspected given + RSV  2. Empiric abx given elevated PCT  3. Cultures negative so far   2.  Bronchospasm secondary to #

## 2017-02-20 NOTE — CDS QUERY
Uncertain Diagnosis  CLINICAL DOCUMENTATION CLARIFICATION FORM  Dear Doctor:  Clinical information (provided below) indicates an unknown status of a documented diagnosis.  For accurate ICD-10-CM code assignment to reflect severity of illness and risk of mor

## 2017-02-20 NOTE — CM/SW NOTE
Received call from Benjamin Infante at [facility]. Pt was at [facility] 2/15- 2/17 during which time they received a referral and accepted the pt for Fernando . However, [facility] and admitted to the floor on 2/18. Therefore, HHC was never started.

## 2017-02-20 NOTE — CM/SW NOTE
02/20/17 0800   Discharge disposition   Discharged to: Home-Health   Name of [address] services after discharge Skilled home care   Patient assessed for rehabilitation services?  Yes   [facility]

## 2017-02-20 NOTE — PLAN OF CARE
PSYCH CONSULT    Date of Admission:  2/17/17  Date of Consult: 2/20/17  Reason for Consultation: Agitation    Impression:  AXIS 1: Acute delirium/encephalopathy and intermittent agitation due to respiratory infection +/- steroids.  Rule out major neurocogni

## 2017-02-20 NOTE — PLAN OF CARE
Achieves stable or improved neurological status Progressing      Achieves optimal ventilation and oxygenation Progressing      Patient will remain free from self-harm Progressing      Assumed care 0730, pt awake, alert to name and birthday, confused to maryjane

## 2017-02-20 NOTE — CONSULTS
[facility]  Report of Psychiatric Consultation    Triston Gonzalez Patient Status:  Inpatient    1927 MRN [id number]   [facility] 5NW-A Attending Soraida Mcnamara, DO   Hosp Day # 3 PCP Krzysztof Deleon     Date of Admission:   Surgical History    BACK SURGERY      Comment 1970's     Family History   Problem Relation Age of Onset   • Cancer Father    • Diabetes Brother      Allergies:  No Known Allergies    Medications:    Current facility-administered medications:   •  haloperid Appearance: fair grooming  Behavior: normal psychomotor  Attitude: cooperative now    Speech: normal rate and rhythm and soft    Mood: Tired  Affect: Congruent    Thought process: tangential, somewhat disoganized  Thought content: no delusions  Perce

## 2017-02-20 NOTE — PAYOR COMM NOTE
Attending Physician: Yvonne Mcadams DO    Review Type: CONTINUED STAY  Reviewer: Cintia KRUEGER     Date: February 20, 2017 - 11:24 AM  Payor: [place] MEDICARE ADV HMO  Authorization Number: N/A  Admit date: 2/17/2017 11:24 PM        REVIEWER COMMENTS    Tem Date Action Dose Route User    2/20/2017 0516 New Bag 3.375 g Intravenous Leigh Donnelly, RN    2/19/2017 2016 New Bag 3.375 g Intravenous Leigh Donnelly, KARINA    2/19/2017 1530 New Bag 3.375 g Intravenous Jania Robles RN      0.9%  NaCl infusion

## 2017-02-21 VITALS
HEIGHT: 71 IN | SYSTOLIC BLOOD PRESSURE: 129 MMHG | BODY MASS INDEX: 24.64 KG/M2 | OXYGEN SATURATION: 94 % | HEART RATE: 93 BPM | DIASTOLIC BLOOD PRESSURE: 80 MMHG | RESPIRATION RATE: 18 BRPM | TEMPERATURE: 98 F | WEIGHT: 176 LBS

## 2017-02-21 PROCEDURE — 99239 HOSP IP/OBS DSCHRG MGMT >30: CPT | Performed by: HOSPITALIST

## 2017-02-21 RX ORDER — BENZONATATE 200 MG/1
200 CAPSULE ORAL 3 TIMES DAILY PRN
Qty: 30 CAPSULE | Refills: 0 | Status: SHIPPED | OUTPATIENT
Start: 2017-02-21 | End: 2017-03-28

## 2017-02-21 RX ORDER — GUAIFENESIN 600 MG
600 TABLET, EXTENDED RELEASE 12 HR ORAL 2 TIMES DAILY
Qty: 14 TABLET | Refills: 0 | Status: SHIPPED | OUTPATIENT
Start: 2017-02-21 | End: 2017-02-28

## 2017-02-21 RX ORDER — RISPERIDONE 0.25 MG/1
0.25 TABLET, FILM COATED ORAL NIGHTLY
Qty: 30 TABLET | Refills: 0 | Status: SHIPPED | OUTPATIENT
Start: 2017-02-21 | End: 2017-03-28

## 2017-02-21 RX ORDER — AMOXICILLIN AND CLAVULANATE POTASSIUM 875; 125 MG/1; MG/1
1 TABLET, FILM COATED ORAL 2 TIMES DAILY
Qty: 14 TABLET | Refills: 0 | Status: SHIPPED | OUTPATIENT
Start: 2017-02-21 | End: 2017-02-28

## 2017-02-21 NOTE — PHYSICAL THERAPY NOTE
PHYSICAL THERAPY TREATMENT NOTE - INPATIENT    Room Number: 503/503-A     Session: 2   Number of Visits to Meet Established Goals: 5    Presenting Problem: SOB, pneumonia     Problem List  Principal Problem:    Dyspnea, unspecified type  Active Problems: chair (including a wheelchair)?: A Little   -   Need to walk in hospital room?: A Little   -   Climbing 3-5 steps with a railing?: A Little       AM-PAC Score:  Raw Score: 20   PT Approx Degree of Impairment Score: 35.83%   Standardized Score (AM-PAC Scale at assistance level: supervision-met 2/20-updated to mod indep        Goal #3  Patient is able to ambulate 250 feet with assist device: cane - straight at assistance level: supervision-distance met, updated to 350' using SC c supervision      Goal #4  Milly

## 2017-02-21 NOTE — PLAN OF CARE
Impaired Functional Mobility    • Achieve highest/safest level of mobility/gait Adequate for Discharge        NEUROLOGICAL - ADULT    • Achieves stable or improved neurological status Adequate for Discharge        Patient/Family Goals    • Patient/Family L

## 2017-02-21 NOTE — CM/SW NOTE
Received [facility] response from [facility] stating they are able to accept patient. SW to send AVS to [facility] as it becomes available.

## 2017-02-21 NOTE — DISCHARGE SUMMARY
[facility] [place] HOSPITALIST  DISCHARGE SUMMARY     Hero Saldaña Patient Status:  Inpatient    1927 MRN [id number]   [facility] 5NW-A Attending Maddy Lou, DO   Hosp Day # 4 PCP Mona Caruso     Date of Admission: 2017  Date dementia.  Patient is complaining of feeling cold not appear to have chills or any chest pain.  No nausea, vomiting, diarrhea, constipation.  No hematochezia, melena, hematuria, hemoptysis, or hematemesis. Brief Synopsis: Patient admitted for pneumonia. medications       Instructions Prescription details    finasteride 5 MG Tabs   Last time this was given:  5 mg on 2/20/2017  8:14 PM   Commonly known as:  PROSCAR        Take 5 mg by mouth nightly.     Refills:  0       Levothyroxine Sodium 50 MCG Tabs   La

## 2017-02-21 NOTE — PROGRESS NOTES
Multidisciplinary Discharge Rounds held 2/21/2017.     Treatment team members present today include , , Charge Nurse,  Nurse, RT, PT and Pharmacy caring for [employer].     Other care providers present:    Patient Active Probl

## 2017-02-22 NOTE — PROGRESS NOTES
NURSING DISCHARGE NOTE    Discharged Home via Wheelchair. Accompanied by Family member and Support staff  Belongings Taken by patient/family. Pt and daughter received discharge instructions and education. PIV removed.  Follow up appointments discuss

## 2017-02-22 NOTE — CM/SW NOTE
Patient discharged on 2/21/17 as previously planned.        02/22/17 0800   Discharge disposition   Discharged to: Home-Health   Name of [address] services after discharge Skilled home care   Discharge transportation Pr

## 2017-03-05 ENCOUNTER — APPOINTMENT (OUTPATIENT)
Dept: CT IMAGING | Age: 82
End: 2017-03-05
Attending: EMERGENCY MEDICINE
Payer: MEDICARE

## 2017-03-05 ENCOUNTER — HOSPITAL ENCOUNTER (EMERGENCY)
Age: 82
Discharge: HOME | End: 2017-03-05
Attending: EMERGENCY MEDICINE
Payer: MEDICARE

## 2017-03-05 VITALS
TEMPERATURE: 99 F | WEIGHT: 180 LBS | OXYGEN SATURATION: 98 % | RESPIRATION RATE: 20 BRPM | HEART RATE: 72 BPM | HEIGHT: 67 IN | BODY MASS INDEX: 28.25 KG/M2 | DIASTOLIC BLOOD PRESSURE: 64 MMHG | SYSTOLIC BLOOD PRESSURE: 134 MMHG

## 2017-03-05 DIAGNOSIS — N13.2 URETERAL STONE WITH HYDRONEPHROSIS: Primary | ICD-10-CM

## 2017-03-05 LAB
ALBUMIN SERPL-MCNC: 3 G/DL (ref 3.5–4.8)
ALP LIVER SERPL-CCNC: 90 U/L (ref 45–117)
ALT SERPL-CCNC: 34 U/L (ref 17–63)
ANTIBODY SCREEN: NEGATIVE
APTT PPP: 27.8 SECONDS (ref 25–34)
AST SERPL-CCNC: 18 U/L (ref 15–41)
BASOPHILS # BLD AUTO: 0.02 X10(3) UL (ref 0–0.1)
BASOPHILS NFR BLD AUTO: 0.3 %
BILIRUB SERPL-MCNC: 1.3 MG/DL (ref 0.1–2)
BUN BLD-MCNC: 20 MG/DL (ref 8–20)
CALCIUM BLD-MCNC: 8.8 MG/DL (ref 8.3–10.3)
CHLORIDE: 108 MMOL/L (ref 101–111)
CO2: 27 MMOL/L (ref 22–32)
CREAT BLD-MCNC: 1.11 MG/DL (ref 0.7–1.3)
EOSINOPHIL # BLD AUTO: 0.11 X10(3) UL (ref 0–0.3)
EOSINOPHIL NFR BLD AUTO: 1.4 %
ERYTHROCYTE [DISTWIDTH] IN BLOOD BY AUTOMATED COUNT: 14.6 % (ref 11.5–16)
GLUCOSE BLD-MCNC: 96 MG/DL (ref 70–99)
HCT VFR BLD AUTO: 44.9 % (ref 37–53)
HGB BLD-MCNC: 14.3 G/DL (ref 13–17)
IMMATURE GRANULOCYTE COUNT: 0.03 X10(3) UL (ref 0–1)
IMMATURE GRANULOCYTE RATIO %: 0.4 %
INR BLD: 1.04 (ref 0.89–1.12)
LACTIC ACID: 2.2 MMOL/L (ref 0.5–2)
LYMPHOCYTES # BLD AUTO: 0.87 X10(3) UL (ref 0.9–4)
LYMPHOCYTES NFR BLD AUTO: 11.2 %
M PROTEIN MFR SERPL ELPH: 7 G/DL (ref 6.1–8.3)
MCH RBC QN AUTO: 26.5 PG (ref 27–33.2)
MCHC RBC AUTO-ENTMCNC: 31.8 G/DL (ref 31–37)
MCV RBC AUTO: 83.1 FL (ref 80–99)
MONOCYTES # BLD AUTO: 0.7 X10(3) UL (ref 0.1–0.6)
MONOCYTES NFR BLD AUTO: 9 %
NEUTROPHIL ABS PRELIM: 6.06 X10 (3) UL (ref 1.3–6.7)
NEUTROPHILS # BLD AUTO: 6.06 X10(3) UL (ref 1.3–6.7)
NEUTROPHILS NFR BLD AUTO: 77.7 %
PLATELET # BLD AUTO: 175 10(3)UL (ref 150–450)
POTASSIUM SERPL-SCNC: 3.8 MMOL/L (ref 3.6–5.1)
PSA SERPL DL<=0.01 NG/ML-MCNC: 13.4 SECONDS (ref 11.8–14.1)
RBC # BLD AUTO: 5.4 X10(6)UL (ref 3.8–5.8)
RED CELL DISTRIBUTION WIDTH-SD: 43.8 FL (ref 35.1–46.3)
RH BLOOD TYPE: POSITIVE
SODIUM SERPL-SCNC: 142 MMOL/L (ref 136–144)
WBC # BLD AUTO: 7.8 X10(3) UL (ref 4–13)

## 2017-03-05 PROCEDURE — 80053 COMPREHEN METABOLIC PANEL: CPT | Performed by: EMERGENCY MEDICINE

## 2017-03-05 PROCEDURE — 86850 RBC ANTIBODY SCREEN: CPT | Performed by: EMERGENCY MEDICINE

## 2017-03-05 PROCEDURE — 96375 TX/PRO/DX INJ NEW DRUG ADDON: CPT

## 2017-03-05 PROCEDURE — 86901 BLOOD TYPING SEROLOGIC RH(D): CPT | Performed by: EMERGENCY MEDICINE

## 2017-03-05 PROCEDURE — 86900 BLOOD TYPING SEROLOGIC ABO: CPT | Performed by: EMERGENCY MEDICINE

## 2017-03-05 PROCEDURE — 83605 ASSAY OF LACTIC ACID: CPT | Performed by: EMERGENCY MEDICINE

## 2017-03-05 PROCEDURE — 99285 EMERGENCY DEPT VISIT HI MDM: CPT

## 2017-03-05 PROCEDURE — 85025 COMPLETE CBC W/AUTO DIFF WBC: CPT | Performed by: EMERGENCY MEDICINE

## 2017-03-05 PROCEDURE — 96361 HYDRATE IV INFUSION ADD-ON: CPT

## 2017-03-05 PROCEDURE — 96374 THER/PROPH/DIAG INJ IV PUSH: CPT

## 2017-03-05 PROCEDURE — 74176 CT ABD & PELVIS W/O CONTRAST: CPT

## 2017-03-05 PROCEDURE — 85730 THROMBOPLASTIN TIME PARTIAL: CPT | Performed by: EMERGENCY MEDICINE

## 2017-03-05 PROCEDURE — 85610 PROTHROMBIN TIME: CPT | Performed by: EMERGENCY MEDICINE

## 2017-03-05 RX ORDER — HYDROCODONE BITARTRATE AND ACETAMINOPHEN 5; 325 MG/1; MG/1
1 TABLET ORAL EVERY 4 HOURS PRN
Qty: 20 TABLET | Refills: 0 | Status: SHIPPED | OUTPATIENT
Start: 2017-03-05 | End: 2017-03-12

## 2017-03-05 RX ORDER — ONDANSETRON 2 MG/ML
INJECTION INTRAMUSCULAR; INTRAVENOUS
Status: COMPLETED
Start: 2017-03-05 | End: 2017-03-05

## 2017-03-05 RX ORDER — FINASTERIDE 5 MG/1
5 TABLET, FILM COATED ORAL DAILY
COMMUNITY
End: 2017-03-28

## 2017-03-05 RX ORDER — TAMSULOSIN HYDROCHLORIDE 0.4 MG/1
0.4 CAPSULE ORAL DAILY
Qty: 7 CAPSULE | Refills: 0 | Status: SHIPPED | OUTPATIENT
Start: 2017-03-05 | End: 2017-03-12

## 2017-03-05 RX ORDER — HYDROMORPHONE HYDROCHLORIDE 1 MG/ML
1 INJECTION, SOLUTION INTRAMUSCULAR; INTRAVENOUS; SUBCUTANEOUS EVERY 30 MIN PRN
Status: DISCONTINUED | OUTPATIENT
Start: 2017-03-05 | End: 2017-03-05

## 2017-03-05 RX ORDER — ONDANSETRON 2 MG/ML
4 INJECTION INTRAMUSCULAR; INTRAVENOUS ONCE
Status: COMPLETED | OUTPATIENT
Start: 2017-03-05 | End: 2017-03-05

## 2017-03-05 RX ORDER — LEVOTHYROXINE SODIUM 0.05 MG/1
50 TABLET ORAL
COMMUNITY
End: 2017-03-28

## 2017-03-05 RX ORDER — RISPERIDONE 0.25 MG/1
0.25 TABLET, FILM COATED ORAL DAILY
COMMUNITY
End: 2017-03-28

## 2017-03-05 NOTE — ED PROVIDER NOTES
Patient Seen in: [facility] [place]    History   Patient presents with:  Abdomen/Flank Pain (GI/)    Stated Complaint: ABD PAIN     HPI    Patient is an 68-year-old with a history of cholecystectomy, hypothyroidism, kidney stones 0640 20   Temp 03/05/17 0640 98.9 °F (37.2 °C)   Temp src 03/05/17 0640 Temporal   SpO2 03/05/17 0640 97 %   O2 Device 03/05/17 0640 None (Room air)       Current:/64 mmHg  Pulse 72  Temp(Src) 98.9 °F (37.2 °C) (Temporal)  Resp 20  Ht 170.2 cm (5' 7\ tests on the individual orders. LACTIC ACID, PLASMA   LACTIC ACID, PLASMA   URINALYSIS WITH CULTURE REFLEX   TYPE AND SCREEN    Narrative: The following orders were created for panel order TYPE AND SCREEN.   Procedure                               Abn prior aspiration, correlate clinically.  Elevation of the right hemidiaphragm. Patient was given Dilaudid and IV fluids. MDM     On reevaluation he feels much improved. Did offer admission for pain control. He is requesting discharge home.   His son

## 2017-03-28 ENCOUNTER — LAB ENCOUNTER (OUTPATIENT)
Dept: LAB | Age: 82
End: 2017-03-28
Attending: INTERNAL MEDICINE
Payer: MEDICARE

## 2017-03-28 ENCOUNTER — TELEPHONE (OUTPATIENT)
Dept: FAMILY MEDICINE CLINIC | Facility: CLINIC | Age: 82
End: 2017-03-28

## 2017-03-28 DIAGNOSIS — N20.0 RECURRENT KIDNEY STONES: ICD-10-CM

## 2017-03-28 DIAGNOSIS — E87.8 ELECTROLYTE DISTURBANCE: ICD-10-CM

## 2017-03-28 DIAGNOSIS — E03.9 ACQUIRED HYPOTHYROIDISM: Chronic | ICD-10-CM

## 2017-03-28 DIAGNOSIS — F03.91 DEMENTIA WITH BEHAVIORAL DISTURBANCE, UNSPECIFIED DEMENTIA TYPE (HCC): ICD-10-CM

## 2017-03-28 PROBLEM — J98.01 ACUTE BRONCHOSPASM: Status: RESOLVED | Noted: 2017-02-15 | Resolved: 2017-03-28

## 2017-03-28 PROBLEM — J21.9 ACUTE BRONCHIOLITIS DUE TO UNSPECIFIED ORGANISM: Status: RESOLVED | Noted: 2017-02-15 | Resolved: 2017-03-28

## 2017-03-28 PROBLEM — R60.0 BILATERAL LEG EDEMA: Status: ACTIVE | Noted: 2017-03-28

## 2017-03-28 PROBLEM — R09.02 HYPOXIA: Status: RESOLVED | Noted: 2017-02-15 | Resolved: 2017-03-28

## 2017-03-28 LAB
ALBUMIN SERPL-MCNC: 3.2 G/DL (ref 3.5–4.8)
ALP LIVER SERPL-CCNC: 91 U/L (ref 45–117)
ALT SERPL-CCNC: 21 U/L (ref 17–63)
AST SERPL-CCNC: 17 U/L (ref 15–41)
BASOPHILS # BLD AUTO: 0.02 X10(3) UL (ref 0–0.1)
BASOPHILS NFR BLD AUTO: 0.3 %
BILIRUB SERPL-MCNC: 1.2 MG/DL (ref 0.1–2)
BILIRUB UR QL STRIP.AUTO: NEGATIVE
BUN BLD-MCNC: 18 MG/DL (ref 8–20)
CALCIUM BLD-MCNC: 9.1 MG/DL (ref 8.3–10.3)
CHLORIDE: 106 MMOL/L (ref 101–111)
CLARITY UR REFRACT.AUTO: CLEAR
CO2: 30 MMOL/L (ref 22–32)
COLOR UR AUTO: YELLOW
CREAT BLD-MCNC: 0.98 MG/DL (ref 0.7–1.3)
EOSINOPHIL # BLD AUTO: 0.04 X10(3) UL (ref 0–0.3)
EOSINOPHIL NFR BLD AUTO: 0.6 %
ERYTHROCYTE [DISTWIDTH] IN BLOOD BY AUTOMATED COUNT: 14.9 % (ref 11.5–16)
FREE T4: 1.2 NG/DL (ref 0.9–1.8)
GLUCOSE BLD-MCNC: 87 MG/DL (ref 70–99)
GLUCOSE UR STRIP.AUTO-MCNC: NEGATIVE MG/DL
HAV AB SERPL IA-ACNC: 279 PG/ML (ref 193–986)
HCT VFR BLD AUTO: 44.2 % (ref 37–53)
HGB BLD-MCNC: 13.5 G/DL (ref 13–17)
IMMATURE GRANULOCYTE COUNT: 0.04 X10(3) UL (ref 0–1)
IMMATURE GRANULOCYTE RATIO %: 0.6 %
KETONES UR STRIP.AUTO-MCNC: NEGATIVE MG/DL
LEUKOCYTE ESTERASE UR QL STRIP.AUTO: NEGATIVE
LYMPHOCYTES # BLD AUTO: 0.95 X10(3) UL (ref 0.9–4)
LYMPHOCYTES NFR BLD AUTO: 15.3 %
M PROTEIN MFR SERPL ELPH: 7 G/DL (ref 6.1–8.3)
MCH RBC QN AUTO: 26.4 PG (ref 27–33.2)
MCHC RBC AUTO-ENTMCNC: 30.5 G/DL (ref 31–37)
MCV RBC AUTO: 86.3 FL (ref 80–99)
MONOCYTES # BLD AUTO: 0.62 X10(3) UL (ref 0.1–0.6)
MONOCYTES NFR BLD AUTO: 10 %
NEUTROPHIL ABS PRELIM: 4.53 X10 (3) UL (ref 1.3–6.7)
NEUTROPHILS # BLD AUTO: 4.53 X10(3) UL (ref 1.3–6.7)
NEUTROPHILS NFR BLD AUTO: 73.2 %
NITRITE UR QL STRIP.AUTO: NEGATIVE
PH UR STRIP.AUTO: 5 [PH] (ref 4.5–8)
PLATELET # BLD AUTO: 181 10(3)UL (ref 150–450)
POTASSIUM SERPL-SCNC: 4.2 MMOL/L (ref 3.6–5.1)
PROT UR STRIP.AUTO-MCNC: NEGATIVE MG/DL
RBC # BLD AUTO: 5.12 X10(6)UL (ref 3.8–5.8)
RBC UR QL AUTO: NEGATIVE
RED CELL DISTRIBUTION WIDTH-SD: 46.5 FL (ref 35.1–46.3)
SODIUM SERPL-SCNC: 142 MMOL/L (ref 136–144)
SP GR UR STRIP.AUTO: 1.01 (ref 1–1.03)
TSI SER-ACNC: 2.14 MIU/ML (ref 0.35–5.5)
UROBILINOGEN UR STRIP.AUTO-MCNC: <2 MG/DL
WBC # BLD AUTO: 6.2 X10(3) UL (ref 4–13)

## 2017-03-28 PROCEDURE — 81003 URINALYSIS AUTO W/O SCOPE: CPT

## 2017-03-28 PROCEDURE — 84443 ASSAY THYROID STIM HORMONE: CPT

## 2017-03-28 PROCEDURE — 80053 COMPREHEN METABOLIC PANEL: CPT

## 2017-03-28 PROCEDURE — 85025 COMPLETE CBC W/AUTO DIFF WBC: CPT

## 2017-03-28 PROCEDURE — 84439 ASSAY OF FREE THYROXINE: CPT

## 2017-03-28 PROCEDURE — 36415 COLL VENOUS BLD VENIPUNCTURE: CPT

## 2017-03-28 PROCEDURE — 82607 VITAMIN B-12: CPT

## 2017-03-28 NOTE — PROGRESS NOTES
CC: Patient presents with:  Establish Care  Medication Follow-Up: increase in risperidone 0.25mg       HPI:      History of kidney stones. Was started on flomax and took medication twice.    Unsure if it is muscle pain as well   Was seen on 3/5 for kidney Thrombocytopenia (HCC)     Elevated hemidiaphragm     Heart failure, unspecified heart failure chronicity, unspecified heart failure type ([facility] [address]     Episodic confusion     At risk for falling     Heart failure ([facility] [address]     RSV bronchitis     Hyponatremia     H MUSCULOSKELETAL: back is not tender,FROM of the back  EXTREMITIES: no cyanosis, no clubbing, no edema  NEURO: Oriented times three,cranial nerves are intact,motor and sensory are grossly intact, Reflexes 2+ bilaterally, 2+ edema      Orders Placed This E (around 4/25/2017).

## 2017-03-28 NOTE — TELEPHONE ENCOUNTER
New patient seen by Dr. Paty Cassidy with dementia with behavioral problems. Patient is combative, violent at times. Was treated by Dr. More Zamudio as inpatient in February with risperidone.   Dr. Paty Cassidy increased dosing of this today, but patient needs urgent appointment

## 2017-03-28 NOTE — TELEPHONE ENCOUNTER
Dr. Jonnathan Lance has appointments on Monday, Tuesday and Wed. He does not start until noon each day until 4:30. If we want a Monday appointment it is at least a month out. I did schedule for 4/4/17 Tuesday at noon.  I left message for POA, daughter to call u

## 2017-03-30 ENCOUNTER — TELEPHONE (OUTPATIENT)
Dept: FAMILY MEDICINE CLINIC | Facility: CLINIC | Age: 82
End: 2017-03-30

## 2017-03-30 NOTE — TELEPHONE ENCOUNTER
----- Message from Chris José MD sent at 3/30/2017 10:11 AM CDT -----  cmp stable   Thyroid stable   b12 low/ recommend starting b12 injectons x 6 months  Cbc stable  ua stable

## 2017-03-31 ENCOUNTER — TELEPHONE (OUTPATIENT)
Dept: FAMILY MEDICINE CLINIC | Facility: CLINIC | Age: 82
End: 2017-03-31

## 2017-03-31 ENCOUNTER — NURSE ONLY (OUTPATIENT)
Dept: FAMILY MEDICINE CLINIC | Facility: CLINIC | Age: 82
End: 2017-03-31

## 2017-03-31 DIAGNOSIS — M25.562 CHRONIC PAIN OF LEFT KNEE: Primary | ICD-10-CM

## 2017-03-31 DIAGNOSIS — G89.29 CHRONIC PAIN OF LEFT KNEE: Primary | ICD-10-CM

## 2017-03-31 PROCEDURE — 96372 THER/PROPH/DIAG INJ SC/IM: CPT | Performed by: INTERNAL MEDICINE

## 2017-03-31 RX ORDER — CYANOCOBALAMIN 1000 UG/ML
1000 INJECTION INTRAMUSCULAR; SUBCUTANEOUS
Status: DISCONTINUED | OUTPATIENT
Start: 2017-03-31 | End: 2017-06-19

## 2017-03-31 RX ADMIN — CYANOCOBALAMIN 1000 MCG: 1000 INJECTION INTRAMUSCULAR; SUBCUTANEOUS at 10:15:00

## 2017-03-31 NOTE — PROGRESS NOTES
Notes Recorded by Atul Shea MD on 3/30/2017 at 10:11 AM  cmp stable   Thyroid stable   b12 low/ recommend starting b12 injectons x 6 months  Cbc stable  ua stable        Ref Range 3/28/17 10:24 AM       Vitamin B12 193-986 pg/mL 279            Patient is

## 2017-03-31 NOTE — TELEPHONE ENCOUNTER
Patient was in office for first B12 injection today and daughter wanted to know if he would be a candidate for knee injection (like synvisc). I explained we do not do that in our office, he would need to see ortho.   Would you refer him to ortho for his se

## 2017-03-31 NOTE — PROGRESS NOTES
Patient is here for his Vitamin B12 injection #1. Given IM to left deltoid without incident. Daughter is with patient and asked if the next shot could be at his appointment 5/8/17.  About one week after due,  I advised that would be fine as it is difficul

## 2017-03-31 NOTE — TELEPHONE ENCOUNTER
Referral entered for Dr. Joey Rocha ortho.  [address] [facility]  [place], [address]   Phone: [phone]     LM for daughter Don Graves to call back to give her this info.

## 2017-04-01 ENCOUNTER — MED REC SCAN ONLY (OUTPATIENT)
Dept: FAMILY MEDICINE CLINIC | Facility: CLINIC | Age: 82
End: 2017-04-01

## 2017-04-01 ENCOUNTER — TELEPHONE (OUTPATIENT)
Dept: FAMILY MEDICINE CLINIC | Facility: CLINIC | Age: 82
End: 2017-04-01

## 2017-04-01 NOTE — TELEPHONE ENCOUNTER
L/M for Hero Loera, daughter (ON HIPPA), informing her Placard form is ready for pick-up since she has to sign the back portion of it. Form placed on in the blue bin at the .

## 2017-04-11 ENCOUNTER — PATIENT MESSAGE (OUTPATIENT)
Dept: FAMILY MEDICINE CLINIC | Facility: CLINIC | Age: 82
End: 2017-04-11

## 2017-04-11 RX ORDER — LEVOTHYROXINE SODIUM 0.05 MG/1
50 TABLET ORAL
Qty: 90 TABLET | Refills: 1 | Status: SHIPPED | OUTPATIENT
Start: 2017-04-11 | End: 2017-01-01

## 2017-04-11 NOTE — TELEPHONE ENCOUNTER
From: Brii Record  To: Calderon Palacio MD  Sent: 4/11/2017 10:18 AM CDT  Subject: Prescription Question    Hello I need a refill for Levothyroxine Sodium 50 MCG Tabs. It requires dr authorization.    Thank you

## 2017-04-11 NOTE — TELEPHONE ENCOUNTER
Requesting Levothyroxine 50mcg  LOV: 3/28/17  RTC: 4 weeks  Last Labs: 3/28/17 wnl  Filled: never filled by our office    Future Appointments  Date Time Provider Carlos Eduardo Sharon   5/8/2017 11:30 AM Yogesh Shah MD [facility] [address]

## 2017-05-08 PROBLEM — F03.90 MODERATE DEMENTIA (HCC): Status: ACTIVE | Noted: 2017-05-08

## 2017-05-08 NOTE — PROGRESS NOTES
CC: Patient presents with:  Lab Results: 1 month follow up. Did see Neuro Dr. Kavin Estrada on 04/04/17. Namenda added. Imm/Inj: Vitamin B 12 injection # 2.        HPI:     He was diagnosed with moderate dementia with Dr. Sherri Flores on namenda 5 mg unspecified heart failure type (HCC)     Episodic confusion     At risk for falling     Heart failure (HCC)     RSV bronchitis     Hyponatremia     Hyperkalemia     Azotemia     Metabolic acidosis     Dyspnea     Metabolic alkalosis     Dyspnea, unspecifie Non-intractable vomiting, presence of nausea not specified, unspecified vomiting type  -one time episode  -we discussed possible 2/2 to PND, try to treat first    Cough  -trial of otc nasal spray   -likey 2/2 pNd     Edema, unspecified type  -daughter

## 2017-05-22 NOTE — TELEPHONE ENCOUNTER
Requesting Vitamin B12 injection at home  LOV: 5/8/17  RTC: 4 weeks  Last Labs: 3/28/17    Notes Recorded by Severiano Sing, MD on 3/30/2017 at 10:11 AM  cmp stable   Thyroid stable   b12 low/ recommend starting b12 injectons x 6 months  Filled: 3/31/17 #1 inj

## 2017-05-22 NOTE — TELEPHONE ENCOUNTER
Kit sent to pharmacy. Left message for daughter that Tanesha Norman and Brooke Shannon were ok'd by MD for home injection and that kits were sent. If missing anything - please call our office.

## 2017-05-23 ENCOUNTER — PATIENT MESSAGE (OUTPATIENT)
Dept: FAMILY MEDICINE CLINIC | Facility: CLINIC | Age: 82
End: 2017-05-23

## 2017-05-23 NOTE — TELEPHONE ENCOUNTER
From: Crystal Brennan  To: Faye Vazquez MD  Sent: 5/23/2017 11:17 AM CDT  Subject: Prescription Question    Hello. I picked up the prescription for the B 12 for Ivan and Rogenia Dill and there are no syringes.  The pharmacy tech said the dr needs to

## 2017-05-25 ENCOUNTER — PATIENT MESSAGE (OUTPATIENT)
Dept: FAMILY MEDICINE CLINIC | Facility: CLINIC | Age: 82
End: 2017-05-25

## 2017-05-25 NOTE — TELEPHONE ENCOUNTER
From: Will Sher  To: Jaz Pacheco MD  Sent: 5/25/2017 9:04 AM CDT  Subject: Non-Urgent Medical Question    Onur Kinney has knee and back pain due to arthritis , is it ok for him to take Aleve ?      Thank you   Carey Briggs

## 2017-06-19 ENCOUNTER — OFFICE VISIT (OUTPATIENT)
Dept: FAMILY MEDICINE CLINIC | Facility: CLINIC | Age: 82
End: 2017-06-19

## 2017-06-19 VITALS
RESPIRATION RATE: 16 BRPM | HEIGHT: 67 IN | SYSTOLIC BLOOD PRESSURE: 118 MMHG | HEART RATE: 74 BPM | BODY MASS INDEX: 30.92 KG/M2 | DIASTOLIC BLOOD PRESSURE: 72 MMHG | WEIGHT: 197 LBS | TEMPERATURE: 98 F

## 2017-06-19 DIAGNOSIS — M25.562 LEFT KNEE PAIN, UNSPECIFIED CHRONICITY: ICD-10-CM

## 2017-06-19 DIAGNOSIS — R41.0 EPISODIC CONFUSION: ICD-10-CM

## 2017-06-19 DIAGNOSIS — J98.6 ELEVATED HEMIDIAPHRAGM: ICD-10-CM

## 2017-06-19 DIAGNOSIS — G93.40 ACUTE ENCEPHALOPATHY: ICD-10-CM

## 2017-06-19 DIAGNOSIS — J15.6 GRAM-NEGATIVE PNEUMONIA (HCC): ICD-10-CM

## 2017-06-19 DIAGNOSIS — F05 SUNDOWNING: ICD-10-CM

## 2017-06-19 DIAGNOSIS — D69.6 THROMBOCYTOPENIA (HCC): ICD-10-CM

## 2017-06-19 DIAGNOSIS — E03.9 HYPOTHYROIDISM, UNSPECIFIED TYPE: Chronic | ICD-10-CM

## 2017-06-19 DIAGNOSIS — R41.0 DELIRIUM: ICD-10-CM

## 2017-06-19 DIAGNOSIS — Z91.81 AT RISK FOR FALLING: ICD-10-CM

## 2017-06-19 DIAGNOSIS — F03.91 DEMENTIA WITH BEHAVIORAL DISTURBANCE, UNSPECIFIED DEMENTIA TYPE (HCC): ICD-10-CM

## 2017-06-19 DIAGNOSIS — N40.0 BENIGN PROSTATIC HYPERPLASIA, PRESENCE OF LOWER URINARY TRACT SYMPTOMS UNSPECIFIED, UNSPECIFIED MORPHOLOGY: ICD-10-CM

## 2017-06-19 DIAGNOSIS — F03.90: ICD-10-CM

## 2017-06-19 DIAGNOSIS — I50.9 HEART FAILURE, UNSPECIFIED HEART FAILURE CHRONICITY, UNSPECIFIED HEART FAILURE TYPE: ICD-10-CM

## 2017-06-19 DIAGNOSIS — R60.0 BILATERAL LEG EDEMA: ICD-10-CM

## 2017-06-19 DIAGNOSIS — Z00.00 ENCOUNTER FOR ANNUAL HEALTH EXAMINATION: Primary | ICD-10-CM

## 2017-06-19 DIAGNOSIS — R00.0 TACHYCARDIA: ICD-10-CM

## 2017-06-19 PROBLEM — R06.00 DYSPNEA, UNSPECIFIED TYPE: Status: RESOLVED | Noted: 2017-02-18 | Resolved: 2017-06-19

## 2017-06-19 PROBLEM — E87.3 METABOLIC ALKALOSIS: Status: RESOLVED | Noted: 2017-02-18 | Resolved: 2017-06-19

## 2017-06-19 PROBLEM — E87.2 METABOLIC ACIDOSIS: Status: RESOLVED | Noted: 2017-02-18 | Resolved: 2017-06-19

## 2017-06-19 PROBLEM — E87.5 HYPERKALEMIA: Status: RESOLVED | Noted: 2017-02-18 | Resolved: 2017-06-19

## 2017-06-19 PROBLEM — R06.00 DYSPNEA: Status: RESOLVED | Noted: 2017-02-18 | Resolved: 2017-06-19

## 2017-06-19 PROBLEM — R79.89 AZOTEMIA: Status: RESOLVED | Noted: 2017-02-18 | Resolved: 2017-06-19

## 2017-06-19 PROBLEM — E87.1 HYPONATREMIA: Status: RESOLVED | Noted: 2017-02-18 | Resolved: 2017-06-19

## 2017-06-19 PROCEDURE — G0439 PPPS, SUBSEQ VISIT: HCPCS | Performed by: PHYSICIAN ASSISTANT

## 2017-06-19 PROCEDURE — 96160 PT-FOCUSED HLTH RISK ASSMT: CPT | Performed by: PHYSICIAN ASSISTANT

## 2017-06-19 RX ORDER — TERAZOSIN 10 MG/1
10 CAPSULE ORAL NIGHTLY
Qty: 90 CAPSULE | Refills: 1 | Status: SHIPPED | OUTPATIENT
Start: 2017-06-19 | End: 2017-01-01

## 2017-06-19 RX ORDER — IPRATROPIUM BROMIDE 21 UG/1
2 SPRAY, METERED NASAL EVERY 12 HOURS
Qty: 1 BOTTLE | Refills: 0 | Status: SHIPPED | OUTPATIENT
Start: 2017-06-19 | End: 2017-01-01 | Stop reason: ALTCHOICE

## 2017-06-19 RX ORDER — RISPERIDONE 0.5 MG/1
0.5 TABLET, FILM COATED ORAL 2 TIMES DAILY
Qty: 60 TABLET | Refills: 2 | Status: SHIPPED | OUTPATIENT
Start: 2017-06-19 | End: 2017-01-01

## 2017-06-19 NOTE — PATIENT INSTRUCTIONS
Thank you for choosing Daphney Martinez PA-C at [place] [address]  To Do: Darrius Chavez  1. Pt to continue medications as prescribed  2.  Pt to follow-up in 3 months, sooner if problems  Effective 6/19/17 until November 2017  Due to [employer] potential risk of harm or side effects or medication interactions.  It is your duty and for your safety to discuss with the pharmacist and our office with questions, and to notify us and stop treatment if problems arise, but know that our intention is that diagnosed with one of the following:   • Hypertension   • Dyslipidemia   • Obesity (BMI ³30 kg/m2)   • Previous elevated impaired FBS or GTT   … or any two of the following:   • Overweight (BMI ³25 but <30)   • Family history of diabetes   • Age 72 years o risk group, make sure you have a referral   Prostate Cancer Screening      PSA  Annually to 75 then as needed or LETTY annually to 75 There are no preventive care reminders to display for this patient. No results found for this or any previous visit.    [place]irk [place] also has the State forms available on it's website for anyone to review and print using their home computer and printer. (the forms are also available in [address])  www. [employer]. org  This link also has information from the [address] harder to walk steadily and keep your balance. Also, the effects of falls are more serious in older people. Overall, 3 to 4 out of every 10 people over the age of 72 fall each year.  Up to 76 percent of people who fracture a hip never recover to the point can easily see where you are going. Avoid storing things in high places so you don’t have to reach or climb. Wear sturdy shoes that fit well – Wearing shoes with high heels or slippery soles, or shoes that are too loose can lead to falls.  Walking around

## 2017-06-19 NOTE — PROGRESS NOTES
Brii Aguirre is a 80year old male who presents for a MA Supervisit.   Pt lives with his wife and son  Claudia Delgado is primary care giver  Son states he does not feel like he needs any help at this time caring for his parents    He is with them majority of day BPH- Chronic controlled on terazosin and finasteride, follows with Dr. Otis Mccoy    Current Outpatient Prescriptions:  risperiDONE 0.5 MG Oral Tab Take 1 tablet (0.5 mg total) by mouth 2 (two) times daily.  Disp: 60 tablet Rfl: 2   Terazosin HCl 10 MG Oral Cap T Balanced    How does the patient maintain a good energy level?: Other    How would you describe your daily physical activity?: Light    How would you describe your current health state?: Fair    How do you maintain positive mental well-being?: Visiting Madi Please go to \"Cognitive Assessment\" under Medicare Assessment section in Charting, test patient and document. .    Then, refresh your progress note to see your input here.   Cognitive Assessment     What day of the week is this?: Incorrect    What mo previous visit.  Update Immunization Activity if applicable        SPECIFIC DISEASE MONITORING Internal Lab or Procedure External Lab or Procedure   Annual Monitoring of Persistent     Medications (ACE/ARB, digoxin, diuretics)    Potassium  Annually SHAMIR History    BACK SURGERY      Comment 1970's    REMOVAL GALLBLADDER        Family History   Problem Relation Age of Onset   • Cancer Father    • Diabetes Brother       SOCIAL HISTORY:     Smoking Status: Former Smoker                   Packs/Day: 0.00  Year CONDITIONS:   Yajaira Elmore is a 80year old male who presents for a Medicare Assessment.      PLAN SUMMARY:   Diagnoses and all orders for this visit:    Encounter for annual health examination    Left knee pain, unspecified chronicity  -     Ortho Refe risperiDONE 0.5 MG Oral Tab; Take 1 tablet (0.5 mg total) by mouth 2 (two) times daily. -     Terazosin HCl 10 MG Oral Cap; Take 1 capsule (10 mg total) by mouth nightly.   -     Ipratropium Bromide (ATROVENT) 0.03 % Nasal Solution; 2 sprays by Nasal rout

## 2017-09-11 NOTE — TELEPHONE ENCOUNTER
From: Margo Nunez, KARINA  To: Jose Canseco  Sent: 9/11/2017 11:30 AM CDT  Subject: B12 refills     Hello,    We received your request to refill B12 through [facility].  I am sending this message through [address] [place] [address] since this is apart of th

## 2017-09-11 NOTE — TELEPHONE ENCOUNTER
Requesting Risperdone   LOV: 6/19/17  RTC: 3 mo   Last Labs: n/a   Filled: 6/19/17 #60 with 2 refills    No future appointments. Patient due for f/u appt.      Time started: 9790    Time ended: 5960    Total time spent on chart: 1 min '

## 2017-10-04 NOTE — TELEPHONE ENCOUNTER
Requesting Finasteride and Levothyroxine   LOV: 6/19/17  RTC: 3 months   Last Labs: pp  Filled:Finasteride 3/28/17 #90 with 1 refill and Levothyroxine 4/11/17  #90 with 1 refills    Future Appointments  Date Time Provider Carlos Eduardo Herrera   10/16/2017 1:1

## 2017-10-16 PROBLEM — M17.12 OSTEOARTHRITIS OF LEFT KNEE: Status: ACTIVE | Noted: 2017-01-01

## 2017-10-16 PROBLEM — E53.8 B12 DEFICIENCY: Status: ACTIVE | Noted: 2017-01-01

## 2017-10-16 NOTE — PROGRESS NOTES
CC: Patient presents with:  Medication Follow-Up       HPI:     Struggles with left knee pain   Moves outside well    Did try knee injection with no improvement   xrays were done and was noted to be bone on bone    BPH   Still takes finasteride and ter HEALTH: feels well otherwise, denied any fevers chills or night sweats   RESPIRATORY: denies shortness of breath   CARDIOVASCULAR: denies chest pain  GI: denies abdominal pain    EXAM:   /68   Pulse 80   Resp 16   Ht 67\"   Wt 192 lb   BMI 30.07 kg/m

## 2017-10-19 NOTE — TELEPHONE ENCOUNTER
Requesting Citalopram 20mg  LOV: 10/16/17  RTC: 6 months  Last Labs: n/a  Filled: 5/4/17 #30 with 2 refills    No future appointments. Shona Alfred is POA but we do not have HIPPA permission  to speak with her.   Can you please explain this to her and have someo

## 2017-10-19 NOTE — TELEPHONE ENCOUNTER
Name of Medication:Citalopram Hydrobromide 20 MG Oral Tab      Dose: How is medication prescribed:    Specific name of pharmacy and location: [facility]/PHARMACY #[id number]- [address], [phone], 165-00

## 2017-10-19 NOTE — TELEPHONE ENCOUNTER
Zahida Owusu called states pt is unable to call office to speak with nurse , states she needs to speak for him or she will send a NealyWeart messaged oh his behalf     Notified Zahida Owusu pt can have hippa form filled out to have her listed

## 2017-11-09 NOTE — TELEPHONE ENCOUNTER
Requesting RISPERIDONE 0.5 MG  LOV: 10/16/17  RTC: 6 mos  Last Labs: 3/28/17  Filled: 11/9/17 #60 with 5 refills-denied    No future appointments.     Time started: 2:42 pm  Time ended: 2:43 pm  Total time spent on chart: 1 min

## 2017-11-09 NOTE — TELEPHONE ENCOUNTER
Requesting risperidone  LOV: 10/16/17  RTC: 6 months  Last Relevant Labs: n/a  Filled: 9/13/17 #60 with 1 refills  Time started: 1037    Time ended: 9830    Total time spent on chart: 2 min    No future appointments.     Non protocol med pended for approval

## 2017-11-09 NOTE — TELEPHONE ENCOUNTER
From: Gabriella Pimentel  Sent: 11/9/2017 11:11 AM CST  Subject: Medication Renewal Request    Gabriella Pimentel would like a refill of the following medications:     RISPERIDONE 0.5 MG Oral Tab Eugene Murray MD]    Preferred pharmacy: [facility]/PHARMACY #[id number] - [place]

## 2017-12-07 NOTE — TELEPHONE ENCOUNTER
Requesting Terazosin 10mg daily  LOV: 10/16/17  RTC: 6 months  Last Relevant Labs:   Filled: 6/18/17 #90 with 1 refills    No future appointments. Passed protocol - med approved.

## 2017-12-19 NOTE — TELEPHONE ENCOUNTER
Home health calling in regards to patient. Faxed over ppw yesterday, which is on Mary's desk.  Kobe Gann called to ask if Dr. Darien Nuñez will cover the patient for any orders, etc for one week until she can get a home health physician through the house call p

## 2017-12-19 NOTE — TELEPHONE ENCOUNTER
I spoke with Jose Angel Cancino at [facility]. Advised that Adelita Padilla is out of the office today. They will await her approval tomorrow. Patient is being discharged and needs in home care.   They are going to set patient up with house call MD since patient cannot ma

## 2017-12-20 NOTE — TELEPHONE ENCOUNTER
LMTCB  Patient is an HMO, needs to stay in network and make sure that a home care physician is covered under his insurance.

## 2017-12-21 NOTE — TELEPHONE ENCOUNTER
[facility] is not in Network - they called today. Patient's daughter will be calling our office. The only Home care that can be used is [facility]. Patient's last office visit here was October. Await phone call.   I spoke with family member to

## 2017-12-22 NOTE — TELEPHONE ENCOUNTER
Pts daughter Dominic Al calling in place of her sister Jaz Navarro who usually calls. Sts she was not able to call today due to her work schedule. Sts that pt needs to have orders and referrals for home health care, home pt care, wheel chair and hospital bed.   Sts t

## 2017-12-22 NOTE — TELEPHONE ENCOUNTER
Spoke to pts daughter Merline Agent, and daughter states that she was told to all pts PCP for [place] orders, PT orders, and orders for a wheelchair and hospital bed.  Advised daughter to contact [facility] for an update, since per last TE 12/19/17, they were going

## 2017-12-26 NOTE — TELEPHONE ENCOUNTER
I have pended the order for the Medical Bed. We need your approval - print order and sign if okay so I can fax to the company.

## 2017-12-26 NOTE — TELEPHONE ENCOUNTER
No face to face is needed per [facility]. They are signed up to care for patient s/p hospitalization. They will be forwarding orders to our office. Patient was in hospital for possible seizures. He also has dementia.   He cannot come to the office as stat

## 2017-12-26 NOTE — TELEPHONE ENCOUNTER
Shantel Arriaga is calling to make check if Dr. Yaquelin Parsons will be taking over home health orders

## 2017-12-26 NOTE — TELEPHONE ENCOUNTER
Contacted pts daughter and informed of order being placed, daughter verbalized understanding. Order for hospital bed faxed to:  Ria Mcclelland fax [phone], confirmation received

## 2017-12-29 NOTE — TELEPHONE ENCOUNTER
sType Date User Summary Attachment   General 12/28/2017 11:28 AM Rosalee Cogan: Referral message -   Note    ----- Message -----  From: Luis Manuel Flores  Sent: 12/28/2017  11:28 AM  To: Nanette Nieves,    I will need a price quote from the prasanth [phone]              Type Date User Summary Attachment   General 12/28/2017 10:17 AM Nicky Monae RN Auto: Referral message -   Note    ----- Message -----  From: Igor San RN  Sent: 12/28/2017  10:17 AM  To: Nella Pimentel Referral Pool    [place]

## 2017-12-29 NOTE — TELEPHONE ENCOUNTER
Spoke to Eboni Cisneros at [facility] mobility and she will fax quote to Star Wong at [phone]      Hello,         I need this quote faxed to me in order to send it to the insurance for a benefit determination.    Thank you          Star Wong   Utilization Man

## 2017-12-29 NOTE — TELEPHONE ENCOUNTER
Contacted Krystin at [facility] mobility to inquire about pricing, Left message to contact us with price.     semi-electric Hospital bed with side rails and spring mattress   [facility]

## 2017-12-29 NOTE — TELEPHONE ENCOUNTER
Return call received.  Per Eboni Cisneros Cost is: $8,841.38    Message sent to the referral dept [facility] [place] and Star Wong) informing of cost.     [employer] [facility]  [facility] [facility] [address]  PHONE [phone]  [phone]

## 2018-01-01 ENCOUNTER — PATIENT OUTREACH (OUTPATIENT)
Dept: CASE MANAGEMENT | Age: 83
End: 2018-01-01

## 2018-01-01 ENCOUNTER — LAB ENCOUNTER (OUTPATIENT)
Dept: LAB | Age: 83
End: 2018-01-01
Attending: FAMILY MEDICINE
Payer: MEDICARE

## 2018-01-01 ENCOUNTER — TELEPHONE (OUTPATIENT)
Dept: FAMILY MEDICINE CLINIC | Facility: CLINIC | Age: 83
End: 2018-01-01

## 2018-01-01 ENCOUNTER — PATIENT MESSAGE (OUTPATIENT)
Dept: INTERNAL MEDICINE CLINIC | Facility: CLINIC | Age: 83
End: 2018-01-01

## 2018-01-01 ENCOUNTER — MED REC SCAN ONLY (OUTPATIENT)
Dept: FAMILY MEDICINE CLINIC | Facility: CLINIC | Age: 83
End: 2018-01-01

## 2018-01-01 ENCOUNTER — PATIENT MESSAGE (OUTPATIENT)
Dept: FAMILY MEDICINE CLINIC | Facility: CLINIC | Age: 83
End: 2018-01-01

## 2018-01-01 ENCOUNTER — OFFICE VISIT (OUTPATIENT)
Dept: FAMILY MEDICINE CLINIC | Facility: CLINIC | Age: 83
End: 2018-01-01

## 2018-01-01 ENCOUNTER — PATIENT OUTREACH (OUTPATIENT)
Dept: FAMILY MEDICINE CLINIC | Facility: CLINIC | Age: 83
End: 2018-01-01

## 2018-01-01 VITALS
SYSTOLIC BLOOD PRESSURE: 116 MMHG | HEART RATE: 76 BPM | TEMPERATURE: 98 F | WEIGHT: 195 LBS | DIASTOLIC BLOOD PRESSURE: 60 MMHG | BODY MASS INDEX: 31 KG/M2

## 2018-01-01 DIAGNOSIS — F03.91 DEMENTIA WITH BEHAVIORAL DISTURBANCE, UNSPECIFIED DEMENTIA TYPE (HCC): ICD-10-CM

## 2018-01-01 DIAGNOSIS — Z00.00 LABORATORY EXAM ORDERED AS PART OF ROUTINE GENERAL MEDICAL EXAMINATION: ICD-10-CM

## 2018-01-01 DIAGNOSIS — F02.81 MAJOR NEUROCOGNITIVE DISORDER DUE TO MULTIPLE ETIOLOGIES WITH BEHAVIORAL DISTURBANCE (HCC): ICD-10-CM

## 2018-01-01 DIAGNOSIS — I51.89 DIASTOLIC DYSFUNCTION: ICD-10-CM

## 2018-01-01 DIAGNOSIS — R56.9 SEIZURE-LIKE ACTIVITY (HCC): ICD-10-CM

## 2018-01-01 DIAGNOSIS — E03.9 HYPOTHYROIDISM, UNSPECIFIED TYPE: ICD-10-CM

## 2018-01-01 DIAGNOSIS — E53.8 B12 DEFICIENCY: ICD-10-CM

## 2018-01-01 DIAGNOSIS — Z76.89 ENCOUNTER TO ESTABLISH CARE WITH NEW DOCTOR: Primary | ICD-10-CM

## 2018-01-01 DIAGNOSIS — I50.9 HEART FAILURE, UNSPECIFIED HEART FAILURE CHRONICITY, UNSPECIFIED HEART FAILURE TYPE: Primary | ICD-10-CM

## 2018-01-01 DIAGNOSIS — M15.9 OSTEOARTHRITIS, GENERALIZED: ICD-10-CM

## 2018-01-01 DIAGNOSIS — F03.90 DEMENTIA WITHOUT BEHAVIORAL DISTURBANCE, UNSPECIFIED DEMENTIA TYPE (HCC): ICD-10-CM

## 2018-01-01 DIAGNOSIS — Z71.89 DNR (DO NOT RESUSCITATE) DISCUSSION: ICD-10-CM

## 2018-01-01 DIAGNOSIS — N40.0 BENIGN PROSTATIC HYPERPLASIA, UNSPECIFIED WHETHER LOWER URINARY TRACT SYMPTOMS PRESENT: ICD-10-CM

## 2018-01-01 DIAGNOSIS — E03.9 HYPOTHYROIDISM, UNSPECIFIED TYPE: Chronic | ICD-10-CM

## 2018-01-01 DIAGNOSIS — Z91.89 AT HIGH RISK FOR ASPIRATION: ICD-10-CM

## 2018-01-01 DIAGNOSIS — M17.12 OSTEOARTHRITIS OF LEFT KNEE, UNSPECIFIED OSTEOARTHRITIS TYPE: ICD-10-CM

## 2018-01-01 DIAGNOSIS — Z91.81 AT RISK FOR FALLING: ICD-10-CM

## 2018-01-01 DIAGNOSIS — F41.9 ANXIETY: ICD-10-CM

## 2018-01-01 DIAGNOSIS — R60.0 BILATERAL LEG EDEMA: ICD-10-CM

## 2018-01-01 DIAGNOSIS — Z99.3 WHEELCHAIR BOUND: Primary | ICD-10-CM

## 2018-01-01 LAB
ALBUMIN SERPL-MCNC: 3 G/DL (ref 3.5–4.8)
ALP LIVER SERPL-CCNC: 97 U/L (ref 45–117)
ALT SERPL-CCNC: 25 U/L (ref 17–63)
AST SERPL-CCNC: 20 U/L (ref 15–41)
BASOPHILS # BLD AUTO: 0.02 X10(3) UL (ref 0–0.1)
BASOPHILS NFR BLD AUTO: 0.3 %
BILIRUB SERPL-MCNC: 0.6 MG/DL (ref 0.1–2)
BUN BLD-MCNC: 20 MG/DL (ref 8–20)
CALCIUM BLD-MCNC: 8.9 MG/DL (ref 8.3–10.3)
CHLORIDE: 105 MMOL/L (ref 101–111)
CO2: 29 MMOL/L (ref 22–32)
CREAT BLD-MCNC: 0.86 MG/DL (ref 0.7–1.3)
EOSINOPHIL # BLD AUTO: 0.1 X10(3) UL (ref 0–0.3)
EOSINOPHIL NFR BLD AUTO: 1.6 %
ERYTHROCYTE [DISTWIDTH] IN BLOOD BY AUTOMATED COUNT: 14 % (ref 11.5–16)
GLUCOSE BLD-MCNC: 87 MG/DL (ref 70–99)
HCT VFR BLD AUTO: 41.2 % (ref 37–53)
HGB BLD-MCNC: 12.4 G/DL (ref 13–17)
IMMATURE GRANULOCYTE COUNT: 0.02 X10(3) UL (ref 0–1)
IMMATURE GRANULOCYTE RATIO %: 0.3 %
LYMPHOCYTES # BLD AUTO: 1.04 X10(3) UL (ref 0.9–4)
LYMPHOCYTES NFR BLD AUTO: 16.4 %
M PROTEIN MFR SERPL ELPH: 6.7 G/DL (ref 6.1–8.3)
MCH RBC QN AUTO: 24.5 PG (ref 27–33.2)
MCHC RBC AUTO-ENTMCNC: 30.1 G/DL (ref 31–37)
MCV RBC AUTO: 81.3 FL (ref 80–99)
MONOCYTES # BLD AUTO: 0.68 X10(3) UL (ref 0.1–1)
MONOCYTES NFR BLD AUTO: 10.7 %
NEUTROPHIL ABS PRELIM: 4.49 X10 (3) UL (ref 1.3–6.7)
NEUTROPHILS # BLD AUTO: 4.49 X10(3) UL (ref 1.3–6.7)
NEUTROPHILS NFR BLD AUTO: 70.7 %
PLATELET # BLD AUTO: 257 10(3)UL (ref 150–450)
POTASSIUM SERPL-SCNC: 3.8 MMOL/L (ref 3.6–5.1)
RBC # BLD AUTO: 5.07 X10(6)UL (ref 3.8–5.8)
RED CELL DISTRIBUTION WIDTH-SD: 41.1 FL (ref 35.1–46.3)
SODIUM SERPL-SCNC: 139 MMOL/L (ref 136–144)
TSI SER-ACNC: 3.64 MIU/ML (ref 0.35–5.5)
WBC # BLD AUTO: 6.4 X10(3) UL (ref 4–13)

## 2018-01-01 PROCEDURE — 36415 COLL VENOUS BLD VENIPUNCTURE: CPT

## 2018-01-01 PROCEDURE — 99215 OFFICE O/P EST HI 40 MIN: CPT | Performed by: FAMILY MEDICINE

## 2018-01-01 PROCEDURE — 80053 COMPREHEN METABOLIC PANEL: CPT

## 2018-01-01 PROCEDURE — 99490 CHRNC CARE MGMT STAFF 1ST 20: CPT

## 2018-01-01 PROCEDURE — 85025 COMPLETE CBC W/AUTO DIFF WBC: CPT

## 2018-01-01 PROCEDURE — 84443 ASSAY THYROID STIM HORMONE: CPT

## 2018-01-01 RX ORDER — CITALOPRAM 20 MG/1
20 TABLET ORAL DAILY
Qty: 90 TABLET | Refills: 3 | Status: SHIPPED | OUTPATIENT
Start: 2018-01-01 | End: 2019-03-02

## 2018-01-01 RX ORDER — LEVOTHYROXINE SODIUM 0.05 MG/1
50 TABLET ORAL
Qty: 90 TABLET | Refills: 3 | Status: SHIPPED | OUTPATIENT
Start: 2018-01-01 | End: 2018-01-01

## 2018-01-01 RX ORDER — FINASTERIDE 5 MG/1
5 TABLET, FILM COATED ORAL DAILY
Qty: 90 TABLET | Refills: 3 | Status: SHIPPED | OUTPATIENT
Start: 2018-01-01 | End: 2018-01-01

## 2018-01-01 RX ORDER — LEVOTHYROXINE SODIUM 0.05 MG/1
50 TABLET ORAL
Qty: 90 TABLET | Refills: 1 | OUTPATIENT
Start: 2018-01-01

## 2018-01-01 RX ORDER — HYDROCODONE BITARTRATE AND ACETAMINOPHEN 5; 325 MG/1; MG/1
1 TABLET ORAL EVERY 8 HOURS PRN
Qty: 30 TABLET | Refills: 2 | Status: SHIPPED | OUTPATIENT
Start: 2018-01-01 | End: 2018-09-03

## 2018-01-01 RX ORDER — LEVETIRACETAM 250 MG/1
250 TABLET ORAL 2 TIMES DAILY
Qty: 60 TABLET | Refills: 0 | OUTPATIENT
Start: 2018-01-01 | End: 2018-01-01

## 2018-01-01 RX ORDER — RISPERIDONE 0.5 MG/1
0.5 TABLET, FILM COATED ORAL 2 TIMES DAILY
Qty: 60 TABLET | Refills: 5 | Status: SHIPPED
Start: 2018-01-01

## 2018-01-01 RX ORDER — LEVOTHYROXINE SODIUM 0.05 MG/1
50 TABLET ORAL
Qty: 90 TABLET | Refills: 2 | Status: SHIPPED
Start: 2018-01-01 | End: 2019-04-07

## 2018-01-01 RX ORDER — LEVETIRACETAM 500 MG/1
500 TABLET ORAL 2 TIMES DAILY
Refills: 0 | COMMUNITY
Start: 2017-01-01 | End: 2018-01-01 | Stop reason: DRUGHIGH

## 2018-01-01 RX ORDER — TERAZOSIN 10 MG/1
10 CAPSULE ORAL NIGHTLY
Qty: 90 CAPSULE | Refills: 1 | Status: SHIPPED | OUTPATIENT
Start: 2018-01-01

## 2018-01-01 RX ORDER — LEVETIRACETAM 250 MG/1
250 TABLET ORAL 2 TIMES DAILY
Qty: 60 TABLET | Refills: 0 | Status: SHIPPED | OUTPATIENT
Start: 2018-01-01 | End: 2018-01-01

## 2018-01-01 RX ORDER — FINASTERIDE 5 MG/1
5 TABLET, FILM COATED ORAL DAILY
Qty: 90 TABLET | Refills: 2 | Status: SHIPPED
Start: 2018-01-01 | End: 2019-04-07

## 2018-01-01 RX ORDER — ALPRAZOLAM 0.5 MG/1
0.5 TABLET ORAL NIGHTLY PRN
Qty: 60 TABLET | Refills: 2 | Status: SHIPPED | OUTPATIENT
Start: 2018-01-01 | End: 2018-09-03

## 2018-01-01 RX ORDER — LEVOTHYROXINE SODIUM 0.05 MG/1
50 TABLET ORAL
Qty: 90 TABLET | Refills: 3 | OUTPATIENT
Start: 2018-01-01 | End: 2019-03-22

## 2018-01-10 NOTE — TELEPHONE ENCOUNTER
Received request from Kourtney with [address] in [facility] for standard wheelchair. Referral placed. Fax sent to Kourtney with [address] at [phone] to notify her referral was placed, confirmation received.

## 2018-01-29 NOTE — TELEPHONE ENCOUNTER
Pt daughter arrived at office with questions about getting her parents home health where a dr can visit them at home as it is too difficult to transport them, especially her father who is in a hospital bed.   Informed daughter that we would find out what we

## 2018-02-06 NOTE — TELEPHONE ENCOUNTER
I am happy to sign this but it is under Dr. Franco Tadeo name. Can we chane it under my name until patient re-establishes?  Thanks

## 2018-02-06 NOTE — TELEPHONE ENCOUNTER
From: Jessy De Leon  To: Guerline Joseph MD  Sent: 2/6/2018 11:07 AM CST  Subject: Visit Follow-up Question    Hi April  I contacted [facility] yesterday to add Dr Gregg Hardy as pcp and they said they were going to submit it to the enrollment dept and that it would ta

## 2018-02-07 NOTE — PROGRESS NOTES
Attempted contacting pt to [facility] and went to voicemail. Did not leave a message due to unidentified person on voicemail. Will continue trying to contact pt to [facility].

## 2018-02-12 NOTE — TELEPHONE ENCOUNTER
Daughter called to follow up on DNR paperwork. She would like to know if you have received forms and when you would anticipate it will be completed?

## 2018-02-12 NOTE — TELEPHONE ENCOUNTER
Daughter called back and said the [facility] is in plan fax is [phone]. Please see attached note and sign if okay. Former patient of Dr. Piero Bender. Daughter spoke with Roxann Cline RN and she told them she would take care of the paperwork. Please advise.

## 2018-02-12 NOTE — TELEPHONE ENCOUNTER
Daughter called back and was provided with [facility] number to find in network [place] [address] agency that will treat patient who lives in [place]. She will call back with list of companies. Will hold for call back.

## 2018-02-13 NOTE — TELEPHONE ENCOUNTER
Referral for [facility] signed by Dr. Jakob Cassidy and faxed to [facility] today and confirmed.     Time 4 min

## 2018-02-19 NOTE — TELEPHONE ENCOUNTER
Message   Received:  Today   Message Contents   Amy Vogt DO  P [facility] Clinical Staff   Caller: Unspecified (2 weeks ago)             HH orders placed at family request.  I have not met him or his wife who are very elderly and home bound.  They woul

## 2018-02-19 NOTE — TELEPHONE ENCOUNTER
Good Afternoon,       Patient must be seen by PCP prior to [facility] being ordered if you have any questions please let me know.        Thank Kimberly Luis    [facility] Care Coordinator    P: [phone]       Referral   Referral # [id number]   Referral [employer]

## 2018-02-20 NOTE — PROGRESS NOTES
2/20/2018  Spoke to Pepe kim a Daughter Maida montes per [employer].      Health Maintenance:   Pneumococcal PPSV23/PCV13 65+ Years / Low and Medium Risk(1 of 2 - PCV13) due on 11/27/1992  Influenza Vaccine(1) due on 09/01/2017  Fall Risk Screening due on 06/19/2018

## 2018-02-20 NOTE — TELEPHONE ENCOUNTER
Spoke to [place] and patient is plugged in to [facility] .  Thanks

## 2018-02-22 NOTE — TELEPHONE ENCOUNTER
Spoke with Darien Heaton, she states that they have not been able to obtain authorization through [facility] as the PCP is listed as Dr. Jessy Booth not Dr. Gregg Hardy.  Notified Diana that the family has notified insurance that Dr. Jessy Booth is no longer with our practice and

## 2018-02-22 NOTE — TELEPHONE ENCOUNTER
Kourtney Copeland From [employer] is calling to get a pre-authorization from Dr. Tip Hearn for home care for the pt. Please call Kourtney Copeland at [phone]    Thanks!

## 2018-02-22 NOTE — TELEPHONE ENCOUNTER
Need verbal authorization. Please call Nilam Lucero [phone],  to verbally authorize for pt to get a visiting physician at home. Thanks!

## 2018-03-01 NOTE — PROGRESS NOTES
Daughter called Tio Sotelo (okay per HIPPA) she wanted to know if I heard anything about Dr. Iven Barthel address being change and or about there parents being changed to Dr. Iven Barthel. I explained to her that I was unsure but would send an e-mail to find out.  I also gav

## 2018-03-07 NOTE — PATIENT INSTRUCTIONS
Pain:  Can alternate tylenol (acetaminophen) and ibuprofen for pain. Then if pain is moderate to severe can use Hydrocodone-Acetaminophen every 8 hours as needed. Do not exceed 3,000 mg of Acetaminophen in 24 hours.   Can be constipating or sedating, may muscle spasms have caused a loss of bladder control. Step 3. Check for injury  · Make sure the victim's mental state has returned to normal. One way to do this is to ask the person his or her name, the year, and your location.   · Injuries can occur to the

## 2018-03-07 NOTE — TELEPHONE ENCOUNTER
Spoke with daughter about booking apt for pt's supervisit, but daughter states this is last apt for parents since  is ordering palliative care

## 2018-03-07 NOTE — PROGRESS NOTES
HPI:   Chirag José is a 80year old male that presents to establish care with new PCP and for follow-up. Patient is seen today with his 49-year-old wife Calvin Pimentel, his daughter Chicho Ellis who is POA, and his son Rupert Fisher who he lives with.       She has histo and children. They are all in agreement that given patient's age and comorbidities a DNR/DNI order is appropriate. Paperwork signed and witnessed and sent for scanning.   There also open to palliative care with possible bridge to hospice especially as they to patient factors    PHYSICAL EXAM:   /60   Pulse 76   Temp 97.6 °F (36.4 °C) (Oral)   Wt 195 lb   BMI 30.54 kg/m²  Estimated body mass index is 30.54 kg/m² as calculated from the following:    Height as of 10/16/17: 67\".     Weight as of this encou Decrease to levetiracetam 250 mg twice day for 2 weeks. Then decrease to levetiracetam 250 mg once a day for 2 weeks then stop.  -Call or follow up if signs of seizure activity     4.  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

## 2018-03-08 NOTE — PROGRESS NOTES
3/8/2018  Spoke to Pepe kim for CCM. Updates to patient care team/ comments: None  Patient reported changes in medications: None  Med Adherence  Comment: Patient is currently taking medication as prescribed.      Health Maintenance:   Pneumococcal PPSV2

## 2018-03-11 PROBLEM — R00.0 TACHYCARDIA: Status: RESOLVED | Noted: 2017-02-18 | Resolved: 2018-01-01

## 2018-03-11 PROBLEM — R41.0 EPISODIC CONFUSION: Status: RESOLVED | Noted: 2017-02-15 | Resolved: 2018-01-01

## 2018-03-11 PROBLEM — M15.9 OSTEOARTHRITIS, GENERALIZED: Status: ACTIVE | Noted: 2017-01-01

## 2018-03-11 PROBLEM — F03.91 DEMENTIA WITH BEHAVIORAL DISTURBANCE (HCC): Status: ACTIVE | Noted: 2017-02-18

## 2018-03-11 PROBLEM — I51.89 DIASTOLIC DYSFUNCTION: Status: ACTIVE | Noted: 2018-01-01

## 2018-03-11 PROBLEM — J98.6 ELEVATED HEMIDIAPHRAGM: Status: RESOLVED | Noted: 2017-02-15 | Resolved: 2018-01-01

## 2018-03-11 PROBLEM — J15.6 GRAM-NEGATIVE PNEUMONIA (HCC): Status: RESOLVED | Noted: 2017-02-18 | Resolved: 2018-01-01

## 2018-03-11 PROBLEM — F02.81 MAJOR NEUROCOGNITIVE DISORDER DUE TO MULTIPLE ETIOLOGIES WITH BEHAVIORAL DISTURBANCE (HCC): Status: ACTIVE | Noted: 2017-02-18

## 2018-03-11 PROBLEM — D69.6 THROMBOCYTOPENIA (HCC): Status: RESOLVED | Noted: 2017-02-15 | Resolved: 2018-01-01

## 2018-03-11 PROBLEM — F41.9 ANXIETY: Status: ACTIVE | Noted: 2018-01-01

## 2018-03-11 PROBLEM — R53.81 PHYSICAL DECONDITIONING: Status: ACTIVE | Noted: 2018-01-01

## 2018-03-11 PROBLEM — Z91.89 AT HIGH RISK FOR ASPIRATION: Status: ACTIVE | Noted: 2018-01-01

## 2018-03-11 PROBLEM — F03.90 MODERATE DEMENTIA (HCC): Status: RESOLVED | Noted: 2017-05-08 | Resolved: 2018-01-01

## 2018-03-15 NOTE — TELEPHONE ENCOUNTER
I talked with Michelle Fernandez and they said they discharged patient on 3/9/18.  Referral to [facility] is pended for palliative care, you need to put in DX

## 2018-03-22 NOTE — TELEPHONE ENCOUNTER
Called back number given and it is for Severo Baldy with [facility].  [facility] to call back

## 2018-03-27 NOTE — TELEPHONE ENCOUNTER
From: Higinio Needs  To:  Muna Velasco DO  Sent: 3/27/2018 1:09 PM CDT  Subject: Prescription Question    Hello   I received a message about my Ivan’s Levothyroxine Sodium 50 MCG Oral Tab has been denied , he still has enough for 3 weeks I have swit

## 2018-03-27 NOTE — TELEPHONE ENCOUNTER
Recd refill request from [facility] in [place] for Levothyroxine 50 mcg tablets for quantity of 90.

## 2018-03-29 NOTE — TELEPHONE ENCOUNTER
Medication Detail      Disp Refills Start End    Levothyroxine Sodium 50 MCG Oral Tab 90 tablet 3 3/7/2018 3/2/2019    Sig - Route:  Take 1 tablet (50 mcg total) by mouth before breakfast. - Oral    E-Prescribing Status: Receipt confirmed by pharmacy (3/7/2

## 2018-04-05 NOTE — TELEPHONE ENCOUNTER
Requesting: Levetiracetam 250mg  LOV: 3/7/18  RTC: 6 months  Last Relevant Labs: 3/7/18  Filled: 3/7/18 #60 with 0 refills    No future appointments. LOV: 3/7/18 per Dr. Katie Jean:  3.  Seizure-like activity (HCC)  - currently on levetiracetam 500 mg BID fo

## 2018-04-12 NOTE — TELEPHONE ENCOUNTER
Thyroid Supplements Protocol Passed  Requesting finasteride 5 MG and Levothyroxine Sodium 50 MCG  LOV: 3/7/18   RTC: 6 mos  Last Labs: 3/7/18  Filled: Levothyroxine 50 mcg 3/7/18 #90 with 3 refills  Finasteride 3/7/18 #90 with 3 refills    No future appoin

## 2018-04-12 NOTE — TELEPHONE ENCOUNTER
From: Gary Maldonado  Sent: 4/11/2018 9:41 PM CDT  Subject: Medication Renewal Request    Gary Maldonado would like a refill of the following medications:     finasteride 5 MG Oral Tab [Amy Vogt DO]   Patient Comment: Please send to [facility] in

## 2018-04-17 NOTE — PROGRESS NOTES
4/17/2018  Spoke to REMINGTON at length about CCM, current care plan and performed CCM assessment with REMINTGON reviewed meds and compliance.  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

## 2018-05-16 NOTE — TELEPHONE ENCOUNTER
Requesting Risperidone 0.25mg  LOV: 3/7/18  RTC: 6 mos  Last Labs: 3/7/18  Filled: 11/9/17 #60 with 3 refills    No future appointments.

## 2018-05-16 NOTE — TELEPHONE ENCOUNTER
From: Hal Ortiz  Sent: 5/14/2018 10:13 PM CDT  Subject: Medication Renewal Request    Hal Ortiz would like a refill of the following medications:     risperiDONE 0.5 MG Oral Tab Neyda Whittaker MD]    Preferred pharmacy: [facility]

## 2018-05-22 NOTE — PROGRESS NOTES
Called and left a message for daughter to call back. Time Spent This Encounter Total: 5 min medical record review, telephone communication, care plan updates where needed, education, goals and action plan recreation/update.  Provided acknowledgment and

## 2018-06-01 NOTE — TELEPHONE ENCOUNTER
Requesting TERAZOSIN HCL 10mg  Hypertension Medications Protocol Passed  LOV: 3/7/18  RTC: 6 mos  Last Labs:   Filled: 12/07/17 #90 with 1 refills    No future appointments.     Approved PP

## 2018-06-21 NOTE — PROGRESS NOTES
6/21/2018  Spoke to [place] daughter for CCM. Patient has been admitted into Hospice. Medications have been stopped. Reason For Follow Up: review progress and or barriers towards patients goals.        Time Spent This Encounter Total: 11 min medical

## 2018-07-18 ENCOUNTER — TELEPHONE (OUTPATIENT)
Dept: FAMILY MEDICINE CLINIC | Facility: CLINIC | Age: 83
End: 2018-07-18
